# Patient Record
Sex: MALE | Race: WHITE | NOT HISPANIC OR LATINO | Employment: FULL TIME | URBAN - METROPOLITAN AREA
[De-identification: names, ages, dates, MRNs, and addresses within clinical notes are randomized per-mention and may not be internally consistent; named-entity substitution may affect disease eponyms.]

---

## 2022-05-25 ENCOUNTER — HOSPITAL ENCOUNTER (EMERGENCY)
Facility: HOSPITAL | Age: 37
Discharge: HOME | End: 2022-05-25
Attending: EMERGENCY MEDICINE | Admitting: EMERGENCY MEDICINE

## 2022-05-25 VITALS
DIASTOLIC BLOOD PRESSURE: 71 MMHG | RESPIRATION RATE: 18 BRPM | HEART RATE: 95 BPM | WEIGHT: 156.97 LBS | OXYGEN SATURATION: 100 % | HEIGHT: 74 IN | TEMPERATURE: 98.2 F | BODY MASS INDEX: 20.14 KG/M2 | SYSTOLIC BLOOD PRESSURE: 124 MMHG

## 2022-05-25 DIAGNOSIS — L03.111 CELLULITIS OF RIGHT AXILLA: Primary | ICD-10-CM

## 2022-05-25 PROCEDURE — 99282 EMERGENCY DEPT VISIT SF MDM: CPT

## 2022-05-25 RX ORDER — SULFAMETHOXAZOLE AND TRIMETHOPRIM 800; 160 MG/1; MG/1
1 TABLET ORAL 2 TIMES DAILY
Qty: 20 TABLET | Refills: 0 | Status: SHIPPED | OUTPATIENT
Start: 2022-05-25 | End: 2022-06-17

## 2022-05-25 RX ORDER — CEPHALEXIN 500 MG/1
500 CAPSULE ORAL 2 TIMES DAILY
Qty: 14 CAPSULE | Refills: 0 | Status: SHIPPED | OUTPATIENT
Start: 2022-05-25 | End: 2022-06-01

## 2022-05-25 NOTE — DISCHARGE INSTRUCTIONS
Complete full course of antibiotics as prescribed.  If symptoms not improved within 3 days, you develop worsening redness, or develop an abscess please return to the emergency department for further care your primary care provider.  Apply heat packs to the area 15 to 20 minutes at a time 4-5 times a day as needed for pain and swelling.

## 2022-05-25 NOTE — ED PROVIDER NOTES
Subjective   Luis Fang is a 36-year-old male who presents to the emergency department today complaining of rash in armpit.  Patient states the area of redness and swelling has been present for approximately 2 days.  Patient states the area is warm to the touch and is very painful.  Patient states he did have a rash that formed across the top of his back along his shoulders that has resolved approximately 1 week ago.  Patient denies fever, chest pain, shortness of breath, nausea, vomiting, diarrhea.  Patient does also mention that he has occasional weakness noted in his left arm since he had an injury to his left shoulder and is wondering if this may be related to nerve pains.  No other complaints or concerns at this time.      History provided by:  Patient   used: No    Rash  Location:  Shoulder/arm  Shoulder/arm rash location:  R axilla  Quality: painful, redness and swelling    Pain details:     Quality:  Aching    Severity:  Moderate    Onset quality:  Gradual    Duration: 2 days.    Timing:  Constant    Progression:  Worsening  Severity:  Mild  Onset quality:  Gradual  Duration: 2 days.  Timing:  Constant  Progression:  Worsening  Chronicity:  New  Relieved by:  None tried  Ineffective treatments:  None tried  Associated symptoms: no abdominal pain, no diarrhea, no fatigue, no fever, no headaches, no joint pain, no myalgias, no nausea, no shortness of breath, no sore throat, no throat swelling, no URI, not vomiting and not wheezing        Review of Systems   Constitutional: Negative for fatigue and fever.   HENT: Negative for sore throat.    Respiratory: Negative for shortness of breath and wheezing.    Gastrointestinal: Negative for abdominal pain, diarrhea, nausea and vomiting.   Musculoskeletal: Negative for arthralgias and myalgias.   Skin: Positive for rash.   Neurological: Negative for headaches.   All other systems reviewed and are negative.      History reviewed. No pertinent past  medical history.    No Known Allergies    History reviewed. No pertinent surgical history.    History reviewed. No pertinent family history.    Social History     Socioeconomic History   • Marital status: Single   Tobacco Use   • Smoking status: Current Every Day Smoker     Packs/day: 0.50     Years: 20.00     Pack years: 10.00   • Smokeless tobacco: Never Used   Vaping Use   • Vaping Use: Never used   Substance and Sexual Activity   • Alcohol use: Yes     Comment: occasionally   • Drug use: Yes     Types: Marijuana     Comment: admits to using earlier today.   • Sexual activity: Defer           Objective   Physical Exam  Vitals and nursing note reviewed.   Constitutional:       General: He is not in acute distress.     Appearance: Normal appearance. He is normal weight. He is not ill-appearing.   HENT:      Head: Normocephalic and atraumatic.      Nose: Nose normal.   Eyes:      Conjunctiva/sclera: Conjunctivae normal.      Pupils: Pupils are equal, round, and reactive to light.   Cardiovascular:      Rate and Rhythm: Normal rate and regular rhythm.      Heart sounds: Normal heart sounds.   Pulmonary:      Effort: Pulmonary effort is normal.      Breath sounds: Normal breath sounds.   Abdominal:      General: Abdomen is flat. Bowel sounds are normal. There is no distension.      Palpations: Abdomen is soft. There is no mass.      Tenderness: There is no abdominal tenderness. There is no guarding or rebound.      Hernia: No hernia is present.   Musculoskeletal:         General: Normal range of motion.      Cervical back: Normal range of motion and neck supple.   Skin:     General: Skin is warm and dry.          Neurological:      General: No focal deficit present.      Mental Status: He is alert and oriented to person, place, and time.   Psychiatric:         Mood and Affect: Mood normal.         Behavior: Behavior normal.         Thought Content: Thought content normal.         Judgment: Judgment normal.          Procedures           ED Course                                                 MDM  Number of Diagnoses or Management Options  Cellulitis of right axilla  Diagnosis management comments: I have spoken with patient. I have explained the patient´s condition, diagnoses and treatment plan based on the information available to me at this time. I have answered the patient's questions and addressed any concerns. The patient has a good  understanding of the patient´s diagnosis, condition, and treatment plan as can be expected at this point. The vital signs have been stable. The patient´s condition is stable and appropriate for discharge from the emergency department.      The patient will pursue further outpatient evaluation with the primary care physician or other designated or consulting physician as outlined in the discharge instructions. They are agreeable to this plan of care and follow-up instructions have been explained in detail. The patient has received these instructions in written format and have expressed an understanding of the discharge instructions. The patient is aware that any significant change in condition or worsening of symptoms should prompt an immediate return to this or the closest emergency department or call to 911.    Risk of Complications, Morbidity, and/or Mortality  Presenting problems: low  Diagnostic procedures: low  Management options: low    Patient Progress  Patient progress: stable      Final diagnoses:   Cellulitis of right axilla       ED Disposition  ED Disposition     ED Disposition   Discharge    Condition   Stable    Comment   --             Provider, No Known  [facility]  [place] [id number]               Medication List      New Prescriptions    cephalexin 500 MG capsule  Commonly known as: KEFLEX  Take 1 capsule by mouth 2 (Two) Times a Day for 7 days.     sulfamethoxazole-trimethoprim 800-160 MG per tablet  Commonly known as: BACTRIM DS,SEPTRA DS  Take 1  tablet by mouth 2 (Two) Times a Day.           Where to Get Your Medications      These medications were sent to [id number] - [place] - [address] AT [facility] - [phone]  - [phone]   [address] [place] [address]    Hours: 24-hours Phone: [phone]   · cephalexin 500 MG capsule  · sulfamethoxazole-trimethoprim 800-160 MG per tablet          Adam Tai PA-C  05/25/22 1921

## 2022-06-17 ENCOUNTER — OFFICE VISIT (OUTPATIENT)
Dept: FAMILY MEDICINE CLINIC | Facility: CLINIC | Age: 37
End: 2022-06-17

## 2022-06-17 VITALS
WEIGHT: 150 LBS | RESPIRATION RATE: 18 BRPM | BODY MASS INDEX: 19.25 KG/M2 | HEART RATE: 95 BPM | HEIGHT: 74 IN | SYSTOLIC BLOOD PRESSURE: 126 MMHG | TEMPERATURE: 97.5 F | OXYGEN SATURATION: 98 % | DIASTOLIC BLOOD PRESSURE: 88 MMHG

## 2022-06-17 DIAGNOSIS — Z23 NEED FOR TETANUS BOOSTER: ICD-10-CM

## 2022-06-17 DIAGNOSIS — A77.0 RMSF (ROCKY MOUNTAIN SPOTTED FEVER): ICD-10-CM

## 2022-06-17 DIAGNOSIS — S70.361A TICK BITE OF RIGHT THIGH, INITIAL ENCOUNTER: ICD-10-CM

## 2022-06-17 DIAGNOSIS — F41.9 ANXIETY: ICD-10-CM

## 2022-06-17 DIAGNOSIS — E53.8 LOW VITAMIN B12 LEVEL: ICD-10-CM

## 2022-06-17 DIAGNOSIS — J30.2 SEASONAL ALLERGIC RHINITIS, UNSPECIFIED TRIGGER: Primary | ICD-10-CM
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
## 2022-06-17 DIAGNOSIS — Z11.59 NEED FOR HEPATITIS C SCREENING TEST: ICD-10-CM

## 2022-06-17 DIAGNOSIS — R53.83 FATIGUE, UNSPECIFIED TYPE: ICD-10-CM

## 2022-06-17 DIAGNOSIS — F33.1 MODERATE EPISODE OF RECURRENT MAJOR DEPRESSIVE DISORDER: ICD-10-CM

## 2022-06-17 LAB
ALBUMIN SERPL-MCNC: 4.8 G/DL (ref 3.5–5.2)
ALBUMIN/GLOB SERPL: 2.1 G/DL
ALP SERPL-CCNC: 71 U/L (ref 39–117)
ALT SERPL W P-5'-P-CCNC: 19 U/L (ref 1–41)
ANION GAP SERPL CALCULATED.3IONS-SCNC: 9.8 MMOL/L (ref 5–15)
AST SERPL-CCNC: 15 U/L (ref 1–40)
BASOPHILS # BLD AUTO: 0.06 10*3/MM3 (ref 0–0.2)
BASOPHILS NFR BLD AUTO: 1 % (ref 0–1.5)
BILIRUB SERPL-MCNC: 0.4 MG/DL (ref 0–1.2)
BUN SERPL-MCNC: 12 MG/DL (ref 6–20)
BUN/CREAT SERPL: 12.4 (ref 7–25)
CALCIUM SPEC-SCNC: 9.4 MG/DL (ref 8.6–10.5)
CHLORIDE SERPL-SCNC: 103 MMOL/L (ref 98–107)
CO2 SERPL-SCNC: 27.2 MMOL/L (ref 22–29)
CREAT SERPL-MCNC: 0.97 MG/DL (ref 0.76–1.27)
DEPRECATED RDW RBC AUTO: 44.2 FL (ref 37–54)
EGFRCR SERPLBLD CKD-EPI 2021: 103.8 ML/MIN/1.73
EOSINOPHIL # BLD AUTO: 0.46 10*3/MM3 (ref 0–0.4)
EOSINOPHIL NFR BLD AUTO: 7.4 % (ref 0.3–6.2)
ERYTHROCYTE [DISTWIDTH] IN BLOOD BY AUTOMATED COUNT: 13 % (ref 12.3–15.4)
FOLATE SERPL-MCNC: 11.1 NG/ML (ref 4.78–24.2)
GLOBULIN UR ELPH-MCNC: 2.3 GM/DL
GLUCOSE SERPL-MCNC: 82 MG/DL (ref 65–99)
HCT VFR BLD AUTO: 43.7 % (ref 37.5–51)
HCV AB SER DONR QL: NORMAL
HGB BLD-MCNC: 14.4 G/DL (ref 13–17.7)
IMM GRANULOCYTES # BLD AUTO: 0.01 10*3/MM3 (ref 0–0.05)
IMM GRANULOCYTES NFR BLD AUTO: 0.2 % (ref 0–0.5)
LYMPHOCYTES # BLD AUTO: 1.59 10*3/MM3 (ref 0.7–3.1)
LYMPHOCYTES NFR BLD AUTO: 25.7 % (ref 19.6–45.3)
MCH RBC QN AUTO: 30.7 PG (ref 26.6–33)
MCHC RBC AUTO-ENTMCNC: 33 G/DL (ref 31.5–35.7)
MCV RBC AUTO: 93.2 FL (ref 79–97)
MONOCYTES # BLD AUTO: 0.59 10*3/MM3 (ref 0.1–0.9)
MONOCYTES NFR BLD AUTO: 9.5 % (ref 5–12)
NEUTROPHILS NFR BLD AUTO: 3.48 10*3/MM3 (ref 1.7–7)
NEUTROPHILS NFR BLD AUTO: 56.2 % (ref 42.7–76)
NRBC BLD AUTO-RTO: 0 /100 WBC (ref 0–0.2)
PLATELET # BLD AUTO: 460 10*3/MM3 (ref 140–450)
PMV BLD AUTO: 9.4 FL (ref 6–12)
POTASSIUM SERPL-SCNC: 4.6 MMOL/L (ref 3.5–5.2)
PROT SERPL-MCNC: 7.1 G/DL (ref 6–8.5)
RBC # BLD AUTO: 4.69 10*6/MM3 (ref 4.14–5.8)
SODIUM SERPL-SCNC: 140 MMOL/L (ref 136–145)
T4 FREE SERPL-MCNC: 1.43 NG/DL (ref 0.93–1.7)
TSH SERPL DL<=0.05 MIU/L-ACNC: 0.39 UIU/ML (ref 0.27–4.2)
VIT B12 BLD-MCNC: 480 PG/ML (ref 211–946)
WBC NRBC COR # BLD: 6.19 10*3/MM3 (ref 3.4–10.8)

## 2022-06-17 PROCEDURE — 80053 COMPREHEN METABOLIC PANEL: CPT | Performed by: NURSE PRACTITIONER

## 2022-06-17 PROCEDURE — 86617 LYME DISEASE ANTIBODY: CPT | Performed by: NURSE PRACTITIONER

## 2022-06-17 PROCEDURE — 84443 ASSAY THYROID STIM HORMONE: CPT | Performed by: NURSE PRACTITIONER

## 2022-06-17 PROCEDURE — 86757 RICKETTSIA ANTIBODY: CPT | Performed by: NURSE PRACTITIONER

## 2022-06-17 PROCEDURE — 85025 COMPLETE CBC W/AUTO DIFF WBC: CPT | Performed by: NURSE PRACTITIONER

## 2022-06-17 PROCEDURE — 90714 TD VACC NO PRESV 7 YRS+ IM: CPT | Performed by: NURSE PRACTITIONER

## 2022-06-17 PROCEDURE — 82607 VITAMIN B-12: CPT | Performed by: NURSE PRACTITIONER

## 2022-06-17 PROCEDURE — 86803 HEPATITIS C AB TEST: CPT | Performed by: NURSE PRACTITIONER

## 2022-06-17 PROCEDURE — 90471 IMMUNIZATION ADMIN: CPT | Performed by: NURSE PRACTITIONER

## 2022-06-17 PROCEDURE — 99204 OFFICE O/P NEW MOD 45 MIN: CPT | Performed by: NURSE PRACTITIONER

## 2022-06-17 PROCEDURE — 86666 EHRLICHIA ANTIBODY: CPT | Performed by: NURSE PRACTITIONER

## 2022-06-17 PROCEDURE — 84439 ASSAY OF FREE THYROXINE: CPT | Performed by: NURSE PRACTITIONER

## 2022-06-17 PROCEDURE — 82746 ASSAY OF FOLIC ACID SERUM: CPT | Performed by: NURSE PRACTITIONER

## 2022-06-17 RX ORDER — DOXYCYCLINE HYCLATE 100 MG/1
100 CAPSULE ORAL 2 TIMES DAILY
Qty: 20 CAPSULE | Refills: 0 | Status: SHIPPED | OUTPATIENT
Start: 2022-06-17 | End: 2022-06-27

## 2022-06-17 RX ORDER — FLUTICASONE PROPIONATE 50 MCG
2 SPRAY, SUSPENSION (ML) NASAL DAILY
Qty: 16 G | Refills: 5 | Status: SHIPPED | OUTPATIENT
Start: 2022-06-17

## 2022-06-17 RX ORDER — CETIRIZINE HYDROCHLORIDE 10 MG/1
10 TABLET ORAL DAILY
Qty: 30 TABLET | Refills: 5 | Status: SHIPPED | OUTPATIENT
Start: 2022-06-17

## 2022-06-17 NOTE — ASSESSMENT & PLAN NOTE
Anxiety is not controlled, will refer him to psychiatry for further evaluation and treatment due to history of adverse side effects to medications.

## 2022-06-17 NOTE — ASSESSMENT & PLAN NOTE
Patient's depression is recurrent and is moderate without psychosis. Their depression is currently active and the condition is newly identified. This will be reassessed at the next regular appointment. F/U as described:patient referred to Mental Health Specialist.  Due to his history of having side effects of antidepressants, will refer him to psychiatry for further evaluation and treatment.

## 2022-06-17 NOTE — PROGRESS NOTES
"Chief Complaint  Nasal Congestion (With clear and yellow mucus, started about 2 months ) and Eye Drainage (Red, itchy, and watery eyes)    Subjective          Luis Fang, 37 y.o. male presents to [facility]  History of Present Illness   He presents today as a new patient to establish care. He is complaining of allergies.  He states he has been having chronic allergies for the past 2 months.  He has tried over-the-counter [place] for cold.  He states he has tried Claritin without any good relief.  He states he did use Flonase recently and that did help.  He denies any ear pain, sore throat, sinus pain or pressure.    PHQ-9 score was positive.  He does have depression and anxiety.  No suicidal ideation/homicidal ideation.  He also states he has a history of bipolar.  He states he never liked taking the medication in the past but knows he probably needs it.  He states some antidepressants have made him feel worse.  He states he has a history of being addicted to pain medications and he does not like taking medications.    He states that he was bit by a tick about a week ago on his right thigh.  He states that it was a large red [place] surrounding the tick bite area but has improved.  He is complaining of fatigue as well.  He does not know when his last tetanus shot was.    Objective   Vital Signs:   /88   Pulse 95   Temp 97.5 °F (36.4 °C)   Resp 18   Ht 188 cm (74\")   Wt 68 kg (150 lb)   SpO2 98%   BMI 19.26 kg/m²     Current Outpatient Medications on File Prior to Visit   Medication Sig Dispense Refill   • [DISCONTINUED] sulfamethoxazole-trimethoprim (BACTRIM DS,SEPTRA DS) 800-160 MG per tablet Take 1 tablet by mouth 2 (Two) Times a Day. 20 tablet 0     No current facility-administered medications on file prior to visit.     Past Medical History:   Diagnosis Date   • Alcoholism (HCC)    • Anxiety    • Asthma    • Broken bones    • Chemical dependency (HCC)    • " Depression    • History of migraine headaches    • History of substance abuse ([facility])    • Shortness of breath    • Sinus congestion    • Substance abuse in family       Physical Exam  Vitals reviewed.   Constitutional:       Appearance: Normal appearance. He is well-developed.   HENT:      Right Ear: Tympanic membrane, ear canal and external ear normal.      Left Ear: Tympanic membrane, ear canal and external ear normal.      Mouth/Throat:      Mouth: Mucous membranes are moist.      Pharynx: No pharyngeal swelling, oropharyngeal exudate or posterior oropharyngeal erythema.   Neck:      Thyroid: No thyroid mass, thyromegaly or thyroid tenderness.   Cardiovascular:      Rate and Rhythm: Normal rate and regular rhythm.      Heart sounds: No murmur heard.    No friction rub. No gallop.   Pulmonary:      Effort: Pulmonary effort is normal.      Breath sounds: Normal breath sounds. No wheezing or rhonchi.   Lymphadenopathy:      Cervical: No cervical adenopathy.   Skin:     General: Skin is warm and dry.      Comments: Erythematous tick bite noted to right upper thigh, approximately 8 to 10 mm diameter.   Neurological:      Mental Status: He is alert and oriented to person, place, and time.      Cranial Nerves: No cranial nerve deficit.   Psychiatric:         Mood and Affect: Mood and affect normal.         Behavior: Behavior normal.         Thought Content: Thought content normal. Thought content does not include homicidal or suicidal ideation.         Judgment: Judgment normal.        Result Review :                 Assessment and Plan    Diagnoses and all orders for this visit:    1. Seasonal allergic rhinitis, unspecified trigger (Primary)  Assessment & Plan:  I will start him on Zyrtec and Flonase daily.  Advised to follow-up if not improving or any worsening of symptoms.    Orders:  -     cetirizine (zyrTEC) 10 MG tablet; Take 1 tablet by mouth Daily.  Dispense: 30 tablet; Refill: 5  -     fluticasone (Flonase) 50  MCG/ACT nasal spray; 2 sprays into the nostril(s) as directed by provider Daily.  Dispense: 16 g; Refill: 5  -     CBC Auto Differential  -     Comprehensive Metabolic Panel    2. Anxiety  Assessment & Plan:  Anxiety is not controlled, will refer him to psychiatry for further evaluation and treatment due to history of adverse side effects to medications.    Orders:  -     Ambulatory Referral to Psychiatry  -     TSH+Free T4  -     CBC Auto Differential  -     Comprehensive Metabolic Panel    3. Moderate episode of recurrent major depressive disorder (HCC)  Assessment & Plan:  Patient's depression is recurrent and is moderate without psychosis. Their depression is currently active and the condition is newly identified. This will be reassessed at the next regular appointment. F/U as described:patient referred to Mental Health Specialist.  Due to his history of having side effects of antidepressants, will refer him to psychiatry for further evaluation and treatment.    Orders:  -     Ambulatory Referral to Psychiatry  -     TSH+Free T4  -     CBC Auto Differential  -     Comprehensive Metabolic Panel    4. Tick bite of right thigh, initial encounter  Comments:  I will start him on doxycycline prophylactically and we will check labs today.  Orders:  -     Ehrlichia Antibody Panel  -     [facility] (IgG / M)  -     Lyme Disease, Line Blot  -     Cancel: Tdap Vaccine Greater Than or Equal To 6yo IM  -     tetanus-diphtheria toxoids (TENIVAC 5-2) injection 0.5 mL    5. Fatigue, unspecified type  Comments:  We will check labs today, will notify results.  Orders:  -     Vitamin B12  -     Folate    6. Need for hepatitis C screening test  -     Hepatitis C Antibody    7. Need for tetanus booster  -     tetanus-diphtheria toxoids (TENIVAC 5-2) injection 0.5 mL    Other orders  -     doxycycline (VIBRAMYCIN) 100 MG capsule; Take 1 capsule by mouth 2 (Two) Times a Day for 10 days.  Dispense: 20 capsule; Refill:  0      Follow Up   Return if symptoms worsen or fail to improve.  Patient was given instructions and counseling regarding his condition or for health maintenance advice. Please see specific information pulled into the AVS if appropriate.

## 2022-06-17 NOTE — ASSESSMENT & PLAN NOTE
I will start him on Zyrtec and Flonase daily.  Advised to follow-up if not improving or any worsening of symptoms.

## 2022-06-20 ENCOUNTER — OFFICE VISIT (OUTPATIENT)
Dept: FAMILY MEDICINE CLINIC | Facility: CLINIC | Age: 37
End: 2022-06-20

## 2022-06-20 VITALS
SYSTOLIC BLOOD PRESSURE: 126 MMHG | BODY MASS INDEX: 20.64 KG/M2 | DIASTOLIC BLOOD PRESSURE: 82 MMHG | RESPIRATION RATE: 18 BRPM | OXYGEN SATURATION: 98 % | HEIGHT: 74 IN | TEMPERATURE: 97.9 F | WEIGHT: 160.8 LBS | HEART RATE: 85 BPM

## 2022-06-20 DIAGNOSIS — S70.361D TICK BITE OF RIGHT THIGH, SUBSEQUENT ENCOUNTER: Primary | ICD-10-CM
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
## 2022-06-20 DIAGNOSIS — R94.8 ABNORMAL METABOLISM: ICD-10-CM
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
## 2022-06-20 PROBLEM — S70.361A TICK BITE OF RIGHT THIGH: Status: ACTIVE | Noted: 2022-06-20

## 2022-06-20 PROCEDURE — 99213 OFFICE O/P EST LOW 20 MIN: CPT | Performed by: NURSE PRACTITIONER

## 2022-06-20 NOTE — ASSESSMENT & PLAN NOTE
We discussed that there is not any medication to slow down his metabolism.  I offered that I could give him medications to increase his appetite but he declines having a decreased appetite.  He will continue with protein powders and eating high calorie diet.

## 2022-06-20 NOTE — ASSESSMENT & PLAN NOTE
Improving on doxycycline.  Advised to continue/finish all doxycycline.  I will notify him when I get the results of the tickborne panels.

## 2022-06-20 NOTE — PROGRESS NOTES
"Chief Complaint  difficulty gaining weight (Would like to discuss options to lower metabolism)    Subjective          Luis Fang, 37 y.o. male presents to [facility]  History of Present Illness   He presents today to discuss trying to gain weight.  He recently had some lab work-up done but we are still waiting for the tickborne illness labs.  He is on doxycycline due to a recent tick bite.  He states that the infected area is looking better and he is starting to feel better.  We reviewed his lab work-up: CBC, CMP, thyroid profile, vitamin B12/folate were all within normal limits.  Hepatitis C screening was negative.  Tickborne illness panels are still pending.    He states he is always had trouble with having a high metabolism and unable to gain weight.  He has done supplements such as Ensure in the past but states they get expensive.  He uses protein powders.  He states that he eats all the time.  He states if he does not keep up eating and supplements that he will lose weight.  He denies any decreased appetite.  His thyroid levels were normal.    Objective   Vital Signs:   /82   Pulse 85   Temp 97.9 °F (36.6 °C)   Resp 18   Ht 188 cm (74\")   Wt 72.9 kg (160 lb 12.8 oz)   SpO2 98%   BMI 20.65 kg/m²     Current Outpatient Medications on File Prior to Visit   Medication Sig Dispense Refill   • cetirizine (zyrTEC) 10 MG tablet Take 1 tablet by mouth Daily. 30 tablet 5   • doxycycline (VIBRAMYCIN) 100 MG capsule Take 1 capsule by mouth 2 (Two) Times a Day for 10 days. 20 capsule 0   • fluticasone (Flonase) 50 MCG/ACT nasal spray 2 sprays into the nostril(s) as directed by provider Daily. 16 g 5     No current facility-administered medications on file prior to visit.     Past Medical History:   Diagnosis Date   • Alcoholism (HCC)    • Anxiety    • Asthma    • Broken bones    • Chemical dependency (HCC)    • Depression    • History of migraine headaches    • History of " substance abuse (HCC)    • Shortness of breath    • Sinus congestion    • Substance abuse in family       Physical Exam  Vitals reviewed.   Constitutional:       Appearance: Normal appearance. He is underweight.   Neck:      Thyroid: No thyroid mass, thyromegaly or thyroid tenderness.   Cardiovascular:      Rate and Rhythm: Normal rate and regular rhythm.      Heart sounds: No murmur heard.    No friction rub. No gallop.   Pulmonary:      Effort: Pulmonary effort is normal.      Breath sounds: Normal breath sounds. No wheezing or rhonchi.   Lymphadenopathy:      Cervical: No cervical adenopathy.   Skin:     General: Skin is warm and dry.   Neurological:      Mental Status: He is alert and oriented to person, place, and time.      Cranial Nerves: No cranial nerve deficit.   Psychiatric:         Mood and Affect: Mood and affect normal.         Behavior: Behavior normal.         Thought Content: Thought content normal. Thought content does not include homicidal or suicidal ideation.         Judgment: Judgment normal.        Result Review :     CMP    CMP 6/17/22   Glucose 82   BUN 12   Creatinine 0.97   Sodium 140   Potassium 4.6   Chloride 103   Calcium 9.4   Albumin 4.80   Total Bilirubin 0.4   Alkaline Phosphatase 71   AST (SGOT) 15   ALT (SGPT) 19           CBC    CBC 6/17/22   WBC 6.19   RBC 4.69   Hemoglobin 14.4   Hematocrit 43.7   MCV 93.2   MCH 30.7   MCHC 33.0   RDW 13.0   Platelets 460 (A)   (A) Abnormal value            TSH    TSH 6/17/22   TSH 0.393                     Assessment and Plan    Diagnoses and all orders for this visit:    1. Tick bite of right thigh, subsequent encounter (Primary)  Assessment & Plan:  Improving on doxycycline.  Advised to continue/finish all doxycycline.  I will notify him when I get the results of the tickborne panels.      2. Abnormal metabolism  Assessment & Plan:  We discussed that there is not any medication to slow down his metabolism.  I offered that I could give him  medications to increase his appetite but he declines having a decreased appetite.  He will continue with protein powders and eating high calorie diet.        Follow Up   Return if symptoms worsen or fail to improve.  Patient was given instructions and counseling regarding his condition or for health maintenance advice. Please see specific information pulled into the AVS if appropriate.

## 2022-06-24 LAB
A PHAGOCYTOPH IGG TITR SER IF: NEGATIVE {TITER}
A PHAGOCYTOPH IGM TITR SER IF: NEGATIVE {TITER}
E CHAFFEENSIS IGG TITR SER IF: NEGATIVE {TITER}
E CHAFFEENSIS IGM TITR SER IF: NEGATIVE {TITER}

## 2022-06-28 LAB
R RICKETTSI IGG SER QL IA: POSITIVE
R RICKETTSI IGG TITR SER IF: NORMAL {TITER}
R RICKETTSI IGM SER-ACNC: 1.05 INDEX (ref 0–0.89)

## 2022-06-29 RX ORDER — LANOLIN ALCOHOL/MO/W.PET/CERES
1000 CREAM (GRAM) TOPICAL DAILY
Qty: 90 TABLET | Refills: 3 | Status: SHIPPED | OUTPATIENT
Start: 2022-06-29 | End: 2022-08-09

## 2022-07-07 LAB
B BURGDOR IGG PATRN SER IB-IMP: NEGATIVE
B BURGDOR IGM PATRN SER IB-IMP: NEGATIVE
B BURGDOR18KD IGG SER QL IB: ABNORMAL
B BURGDOR23KD IGG SER QL IB: ABNORMAL
B BURGDOR23KD IGM SER QL IB: ABNORMAL
B BURGDOR28KD IGG SER QL IB: ABNORMAL
B BURGDOR30KD IGG SER QL IB: ABNORMAL
B BURGDOR39KD IGG SER QL IB: ABNORMAL
B BURGDOR39KD IGM SER QL IB: ABNORMAL
B BURGDOR41KD IGG SER QL IB: PRESENT
B BURGDOR41KD IGM SER QL IB: ABNORMAL
B BURGDOR45KD IGG SER QL IB: ABNORMAL
B BURGDOR58KD IGG SER QL IB: ABNORMAL
B BURGDOR66KD IGG SER QL IB: ABNORMAL
B BURGDOR93KD IGG SER QL IB: ABNORMAL
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
## 2022-08-08 NOTE — PATIENT INSTRUCTIONS
1.  Please return to clinic at your next scheduled visit.  Contact the clinic ([phone]) at least 24 hours prior in the event you need to cancel.  2.  Do no harm to yourself or others.    3.  Avoid alcohol and drugs.    4.  Take all medications as prescribed.  Please contact the clinic with any concerns. If you are in need of medication refills, please call the clinic at [phone].    5. Should you want to get in touch with your provider, Dr. Edin Dean, please utilize [employer] or contact the office ([phone]), and staff will be able to page Dr. Dean directly.  6.  In the event you have personal crisis, contact the following crisis numbers: Suicide Prevention Hotline 1-992.482.5574; [facility] Helpline 8-223-328-BVWQ; [facility] Emergency Room [phone]; text HELLO to [phone]; or 582.     SPECIFIC RECOMMENDATIONS:     1.      Medications discussed at this encounter:                   - start bupropion     2.      Psychotherapy recommendations:      3.     Return to clinic: 6 weeks

## 2022-08-09 ENCOUNTER — OFFICE VISIT (OUTPATIENT)
Dept: PSYCHIATRY | Facility: CLINIC | Age: 37
End: 2022-08-09

## 2022-08-09 VITALS
HEIGHT: 74 IN | DIASTOLIC BLOOD PRESSURE: 72 MMHG | WEIGHT: 156 LBS | SYSTOLIC BLOOD PRESSURE: 145 MMHG | BODY MASS INDEX: 20.02 KG/M2

## 2022-08-09 DIAGNOSIS — F41.1 GENERALIZED ANXIETY DISORDER: ICD-10-CM

## 2022-08-09 DIAGNOSIS — F51.05 INSOMNIA DUE TO MENTAL CONDITION: ICD-10-CM

## 2022-08-09 DIAGNOSIS — F33.1 MAJOR DEPRESSIVE DISORDER, RECURRENT EPISODE, MODERATE: Primary | ICD-10-CM

## 2022-08-09 DIAGNOSIS — F90.0 ADHD (ATTENTION DEFICIT HYPERACTIVITY DISORDER), INATTENTIVE TYPE: ICD-10-CM

## 2022-08-09 PROCEDURE — 90792 PSYCH DIAG EVAL W/MED SRVCS: CPT | Performed by: STUDENT IN AN ORGANIZED HEALTH CARE EDUCATION/TRAINING PROGRAM

## 2022-08-09 RX ORDER — MULTIPLE VITAMINS W/ MINERALS TAB 9MG-400MCG
1 TAB ORAL DAILY
COMMUNITY

## 2022-08-09 RX ORDER — BUPROPION HYDROCHLORIDE 150 MG/1
150 TABLET ORAL EVERY MORNING
Qty: 30 TABLET | Refills: 2 | Status: SHIPPED | OUTPATIENT
Start: 2022-08-09 | End: 2022-10-16

## 2022-08-09 NOTE — TREATMENT PLAN
Multi-Disciplinary Problems (from Behavioral Health Treatment Plan)    Active Problems     Problem: Anxiety  Start Date: 08/09/22    Problem Details: The patient self-scales this problem as a 6 with 10 being the worst.        Goal Priority Start Date Expected End Date End Date    Patient will develop and implement behavioral and cognitive strategies to reduce anxiety and irrational fears. -- 08/09/22 -- --    Goal Details: Progress toward goal:  Not appropriate to rate progress toward goal since this is the initial treatment plan.        Goal Intervention Frequency Start Date End Date    Help patient explore past emotional issues in relation to present anxiety. Q Month 08/09/22 --    Intervention Details: Duration of treatment until until remission of symptoms.        Goal Intervention Frequency Start Date End Date    Help patient develop an awareness of their cognitive and physical responses to anxiety. Q Month 08/09/22 --    Intervention Details: Duration of treatment until until remission of symptoms.              Problem: Depression  Start Date: 08/09/22    Problem Details: The patient self-scales this problem as a 9 with 10 being the worst.        Goal Priority Start Date Expected End Date End Date    Patient will demonstrate the ability to initiate new constructive life skills outside of sessions on a consistent basis. -- 08/09/22 -- --    Goal Details: Progress toward goal:  Not appropriate to rate progress toward goal since this is the initial treatment plan.        Goal Intervention Frequency Start Date End Date    Assist patient in setting attainable activities of daily living goals. PRN 08/09/22 --    Goal Intervention Frequency Start Date End Date    Provide education about depression Q Month 08/09/22 --    Intervention Details: Duration of treatment until until remission of symptoms.        Goal Intervention Frequency Start Date End Date    Assist patient in developing healthy coping strategies. Q Month  08/09/22 --    Intervention Details: Duration of treatment until until remission of symptoms.              Problem: ADHD (Adult)  Start Date: 08/09/22    Problem Details: The patient self-scales this problem as a 10 with 10 being the worst.      Goal Priority Start Date Expected End Date End Date    Patient will sustain attention and concentration to complete chores, and work responsibilites and increase positive interaction in all relationships. -- 08/09/22 -- --    Goal Details: Progress toward goal:  Not appropriate to rate progress toward goal since this is the initial treatment plan.      Goal Intervention Frequency Start Date End Date    Assist patient in setting responsible goals and breaking down large tasks. Q Month 08/09/22 --    Intervention Details: Duration of treatment until until remission of symptoms.      Goal Intervention Frequency Start Date End Date    Assist patient in using self monitoring checklist to improve attention, work performance, and social skills. Q Month 08/09/22 --    Intervention Details: Duration of treatment until until remission of symptoms.                  Reviewed By     Edin Dean MD 08/09/22 2748                 I have discussed and reviewed this treatment plan with the patient.

## 2022-08-23 ENCOUNTER — PATIENT ROUNDING (BHMG ONLY) (OUTPATIENT)
Dept: PSYCHIATRY | Facility: CLINIC | Age: 37
End: 2022-08-23

## 2022-08-23 NOTE — PROGRESS NOTES
August 23, 2022    Hello, may I speak with Luis Fang?    My name is Kyara      I am  with [facility]  [facility]  [address] [facility]  [place] [address] [phone].    Before we get started may I verify your date of birth? 1985    I am calling to officially welcome you to our practice and ask about your recent visit. Is this a good time to talk? No, left voicemail to why I was calling    Tell me about your visit with us. What things went well?  N/A       We're always looking for ways to make our patients' experiences even better. Do you have recommendations on ways we may improve?  N/A    Overall were you satisfied with your first visit to our practice? N/A       I appreciate you taking the time to speak with me today. Is there anything else I can do for you? N/A      Thank you, and have a great day.

## 2022-09-27 ENCOUNTER — HOSPITAL ENCOUNTER (EMERGENCY)
Facility: HOSPITAL | Age: 37
Discharge: HOME | End: 2022-09-27
Attending: EMERGENCY MEDICINE | Admitting: EMERGENCY MEDICINE

## 2022-09-27 ENCOUNTER — APPOINTMENT (OUTPATIENT)
Dept: GENERAL RADIOLOGY | Facility: HOSPITAL | Age: 37
End: 2022-09-27

## 2022-09-27 VITALS
BODY MASS INDEX: 18.16 KG/M2 | OXYGEN SATURATION: 100 % | DIASTOLIC BLOOD PRESSURE: 87 MMHG | WEIGHT: 141.54 LBS | SYSTOLIC BLOOD PRESSURE: 139 MMHG | HEART RATE: 90 BPM | RESPIRATION RATE: 18 BRPM | HEIGHT: 74 IN | TEMPERATURE: 98.1 F

## 2022-09-27 DIAGNOSIS — G89.29 CHRONIC PAIN OF LEFT KNEE: Primary | ICD-10-CM

## 2022-09-27 DIAGNOSIS — M25.562 CHRONIC PAIN OF LEFT KNEE: Primary | ICD-10-CM

## 2022-09-27 PROCEDURE — 73562 X-RAY EXAM OF KNEE 3: CPT

## 2022-09-27 PROCEDURE — 99283 EMERGENCY DEPT VISIT LOW MDM: CPT

## 2022-10-02 ENCOUNTER — APPOINTMENT (OUTPATIENT)
Dept: CT IMAGING | Facility: HOSPITAL | Age: 37
End: 2022-10-02

## 2022-10-02 ENCOUNTER — HOSPITAL ENCOUNTER (EMERGENCY)
Facility: HOSPITAL | Age: 37
Discharge: HOME | End: 2022-10-02
Attending: EMERGENCY MEDICINE | Admitting: EMERGENCY MEDICINE

## 2022-10-02 VITALS
TEMPERATURE: 97.8 F | WEIGHT: 142.42 LBS | HEIGHT: 74 IN | OXYGEN SATURATION: 100 % | RESPIRATION RATE: 18 BRPM | BODY MASS INDEX: 18.28 KG/M2 | DIASTOLIC BLOOD PRESSURE: 71 MMHG | HEART RATE: 74 BPM | SYSTOLIC BLOOD PRESSURE: 111 MMHG

## 2022-10-02 DIAGNOSIS — G89.29 CHRONIC PAIN OF LEFT KNEE: ICD-10-CM

## 2022-10-02 DIAGNOSIS — M25.562 CHRONIC PAIN OF LEFT KNEE: ICD-10-CM

## 2022-10-02 DIAGNOSIS — K59.00 CONSTIPATION, UNSPECIFIED CONSTIPATION TYPE: ICD-10-CM

## 2022-10-02 DIAGNOSIS — S83.005A DISLOCATION OF LEFT PATELLA, INITIAL ENCOUNTER: ICD-10-CM

## 2022-10-02 DIAGNOSIS — R10.32 LEFT LOWER QUADRANT ABDOMINAL PAIN: Primary | ICD-10-CM

## 2022-10-02 LAB
BASOPHILS # BLD AUTO: 0.07 10*3/MM3 (ref 0–0.2)
BASOPHILS NFR BLD AUTO: 0.9 % (ref 0–1.5)
BILIRUB UR QL STRIP: NEGATIVE
C TRACH RRNA CVX QL NAA+PROBE: NOT DETECTED
CLARITY UR: CLEAR
COLOR UR: YELLOW
DEPRECATED RDW RBC AUTO: 43.2 FL (ref 37–54)
EOSINOPHIL # BLD AUTO: 0.9 10*3/MM3 (ref 0–0.4)
EOSINOPHIL NFR BLD AUTO: 11.1 % (ref 0.3–6.2)
ERYTHROCYTE [DISTWIDTH] IN BLOOD BY AUTOMATED COUNT: 12.9 % (ref 12.3–15.4)
GLUCOSE UR STRIP-MCNC: NEGATIVE MG/DL
HCT VFR BLD AUTO: 46.6 % (ref 37.5–51)
HGB BLD-MCNC: 15.7 G/DL (ref 13–17.7)
HGB UR QL STRIP.AUTO: NEGATIVE
IMM GRANULOCYTES # BLD AUTO: 0.01 10*3/MM3 (ref 0–0.05)
IMM GRANULOCYTES NFR BLD AUTO: 0.1 % (ref 0–0.5)
KETONES UR QL STRIP: NEGATIVE
LEUKOCYTE ESTERASE UR QL STRIP.AUTO: NEGATIVE
LYMPHOCYTES # BLD AUTO: 3.01 10*3/MM3 (ref 0.7–3.1)
LYMPHOCYTES NFR BLD AUTO: 37 % (ref 19.6–45.3)
MCH RBC QN AUTO: 31 PG (ref 26.6–33)
MCHC RBC AUTO-ENTMCNC: 33.7 G/DL (ref 31.5–35.7)
MCV RBC AUTO: 91.9 FL (ref 79–97)
MONOCYTES # BLD AUTO: 0.74 10*3/MM3 (ref 0.1–0.9)
MONOCYTES NFR BLD AUTO: 9.1 % (ref 5–12)
N GONORRHOEA RRNA SPEC QL NAA+PROBE: NOT DETECTED
NEUTROPHILS NFR BLD AUTO: 3.41 10*3/MM3 (ref 1.7–7)
NEUTROPHILS NFR BLD AUTO: 41.8 % (ref 42.7–76)
NITRITE UR QL STRIP: NEGATIVE
NRBC BLD AUTO-RTO: 0 /100 WBC (ref 0–0.2)
PH UR STRIP.AUTO: 5.5 [PH] (ref 5–8)
PLATELET # BLD AUTO: 439 10*3/MM3 (ref 140–450)
PMV BLD AUTO: 9.3 FL (ref 6–12)
PROT UR QL STRIP: NEGATIVE
RBC # BLD AUTO: 5.07 10*6/MM3 (ref 4.14–5.8)
SP GR UR STRIP: 1.01 (ref 1–1.03)
UROBILINOGEN UR QL STRIP: NORMAL
WBC NRBC COR # BLD: 8.14 10*3/MM3 (ref 3.4–10.8)

## 2022-10-02 PROCEDURE — 87491 CHLMYD TRACH DNA AMP PROBE: CPT | Performed by: EMERGENCY MEDICINE

## 2022-10-02 PROCEDURE — 36415 COLL VENOUS BLD VENIPUNCTURE: CPT

## 2022-10-02 PROCEDURE — 74177 CT ABD & PELVIS W/CONTRAST: CPT

## 2022-10-02 PROCEDURE — 99284 EMERGENCY DEPT VISIT MOD MDM: CPT

## 2022-10-02 PROCEDURE — 85025 COMPLETE CBC W/AUTO DIFF WBC: CPT | Performed by: EMERGENCY MEDICINE

## 2022-10-02 PROCEDURE — 0 IOPAMIDOL PER 1 ML: Performed by: EMERGENCY MEDICINE

## 2022-10-02 PROCEDURE — 87591 N.GONORRHOEAE DNA AMP PROB: CPT | Performed by: EMERGENCY MEDICINE

## 2022-10-02 PROCEDURE — 81003 URINALYSIS AUTO W/O SCOPE: CPT

## 2022-10-02 RX ORDER — SODIUM CHLORIDE 0.9 % (FLUSH) 0.9 %
10 SYRINGE (ML) INJECTION AS NEEDED
Status: DISCONTINUED | OUTPATIENT
Start: 2022-10-02 | End: 2022-10-02 | Stop reason: HOSPADM

## 2022-10-02 RX ORDER — DOCUSATE SODIUM 100 MG/1
100 CAPSULE, LIQUID FILLED ORAL 2 TIMES DAILY
Qty: 6 CAPSULE | Refills: 0 | Status: SHIPPED | OUTPATIENT
Start: 2022-10-02 | End: 2022-10-05

## 2022-10-02 RX ADMIN — SODIUM CHLORIDE 1000 ML: 9 INJECTION, SOLUTION INTRAVENOUS at 15:33

## 2022-10-02 RX ADMIN — IOPAMIDOL 100 ML: 755 INJECTION, SOLUTION INTRAVENOUS at 16:00
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
## 2022-10-02 NOTE — DISCHARGE INSTRUCTIONS
Please drink plenty of fluids.    Please follow-up with your doctor for reexamination of the abdomen, after you have used the mag citrate and Dulcolax.     Return to the emergency room immediately for intractable pain, intractable vomiting, fever, shaking chills, muscle aches, near passing out, passing out or any new symptoms you are concerned with    Please use over-the-counter Tylenol and Motrin for the left knee pain    No weightbearing on the left knee until released by orthopedic surgeon.  Please wear your knee immobilizer while up and awake

## 2022-10-16 ENCOUNTER — HOSPITAL ENCOUNTER (EMERGENCY)
Facility: HOSPITAL | Age: 37
Discharge: HOME | End: 2022-10-16
Attending: EMERGENCY MEDICINE | Admitting: EMERGENCY MEDICINE

## 2022-10-16 VITALS
BODY MASS INDEX: 18.5 KG/M2 | RESPIRATION RATE: 20 BRPM | OXYGEN SATURATION: 97 % | HEIGHT: 74 IN | SYSTOLIC BLOOD PRESSURE: 159 MMHG | TEMPERATURE: 98 F | HEART RATE: 86 BPM | DIASTOLIC BLOOD PRESSURE: 89 MMHG | WEIGHT: 144.18 LBS

## 2022-10-16 DIAGNOSIS — F15.10 METHAMPHETAMINE ABUSE: Primary | ICD-10-CM

## 2022-10-16 LAB
ALBUMIN SERPL-MCNC: 4.6 G/DL (ref 3.5–5.2)
ALBUMIN/GLOB SERPL: 1.5 G/DL
ALP SERPL-CCNC: 57 U/L (ref 39–117)
ALT SERPL W P-5'-P-CCNC: 16 U/L (ref 1–41)
AMPHET+METHAMPHET UR QL: POSITIVE
ANION GAP SERPL CALCULATED.3IONS-SCNC: 7.5 MMOL/L (ref 5–15)
APAP SERPL-MCNC: <5 MCG/ML (ref 0–30)
AST SERPL-CCNC: 14 U/L (ref 1–40)
BARBITURATES UR QL SCN: NEGATIVE
BASOPHILS # BLD AUTO: 0.04 10*3/MM3 (ref 0–0.2)
BASOPHILS NFR BLD AUTO: 0.4 % (ref 0–1.5)
BENZODIAZ UR QL SCN: NEGATIVE
BILIRUB SERPL-MCNC: 0.2 MG/DL (ref 0–1.2)
BUN SERPL-MCNC: 13 MG/DL (ref 6–20)
BUN/CREAT SERPL: 14.3 (ref 7–25)
CALCIUM SPEC-SCNC: 9.2 MG/DL (ref 8.6–10.5)
CANNABINOIDS SERPL QL: POSITIVE
CHLORIDE SERPL-SCNC: 97 MMOL/L (ref 98–107)
CO2 SERPL-SCNC: 26.5 MMOL/L (ref 22–29)
COCAINE UR QL: NEGATIVE
CREAT SERPL-MCNC: 0.91 MG/DL (ref 0.76–1.27)
DEPRECATED RDW RBC AUTO: 42.2 FL (ref 37–54)
EGFRCR SERPLBLD CKD-EPI 2021: 112 ML/MIN/1.73
EOSINOPHIL # BLD AUTO: 0.35 10*3/MM3 (ref 0–0.4)
EOSINOPHIL NFR BLD AUTO: 3.2 % (ref 0.3–6.2)
ERYTHROCYTE [DISTWIDTH] IN BLOOD BY AUTOMATED COUNT: 12.8 % (ref 12.3–15.4)
ETHANOL BLD-MCNC: <10 MG/DL (ref 0–10)
ETHANOL UR QL: <0.01 %
GLOBULIN UR ELPH-MCNC: 3 GM/DL
GLUCOSE BLDC GLUCOMTR-MCNC: 119 MG/DL (ref 70–99)
GLUCOSE SERPL-MCNC: 134 MG/DL (ref 65–99)
HCT VFR BLD AUTO: 43 % (ref 37.5–51)
HGB BLD-MCNC: 14.6 G/DL (ref 13–17.7)
HOLD SPECIMEN: NORMAL
HOLD SPECIMEN: NORMAL
IMM GRANULOCYTES # BLD AUTO: 0.02 10*3/MM3 (ref 0–0.05)
IMM GRANULOCYTES NFR BLD AUTO: 0.2 % (ref 0–0.5)
LYMPHOCYTES # BLD AUTO: 1.97 10*3/MM3 (ref 0.7–3.1)
LYMPHOCYTES NFR BLD AUTO: 17.8 % (ref 19.6–45.3)
MCH RBC QN AUTO: 30.7 PG (ref 26.6–33)
MCHC RBC AUTO-ENTMCNC: 34 G/DL (ref 31.5–35.7)
MCV RBC AUTO: 90.3 FL (ref 79–97)
METHADONE UR QL SCN: NEGATIVE
MONOCYTES # BLD AUTO: 0.78 10*3/MM3 (ref 0.1–0.9)
MONOCYTES NFR BLD AUTO: 7 % (ref 5–12)
NEUTROPHILS NFR BLD AUTO: 7.92 10*3/MM3 (ref 1.7–7)
NEUTROPHILS NFR BLD AUTO: 71.4 % (ref 42.7–76)
NRBC BLD AUTO-RTO: 0 /100 WBC (ref 0–0.2)
OPIATES UR QL: NEGATIVE
OXYCODONE UR QL SCN: NEGATIVE
PLATELET # BLD AUTO: 444 10*3/MM3 (ref 140–450)
PMV BLD AUTO: 8.3 FL (ref 6–12)
POTASSIUM SERPL-SCNC: 4.1 MMOL/L (ref 3.5–5.2)
PROT SERPL-MCNC: 7.6 G/DL (ref 6–8.5)
RBC # BLD AUTO: 4.76 10*6/MM3 (ref 4.14–5.8)
SALICYLATES SERPL-MCNC: 1.9 MG/DL
SODIUM SERPL-SCNC: 131 MMOL/L (ref 136–145)
WBC NRBC COR # BLD: 11.08 10*3/MM3 (ref 3.4–10.8)
WHOLE BLOOD HOLD COAG: NORMAL
WHOLE BLOOD HOLD SPECIMEN: NORMAL

## 2022-10-16 PROCEDURE — 80307 DRUG TEST PRSMV CHEM ANLYZR: CPT | Performed by: EMERGENCY MEDICINE

## 2022-10-16 PROCEDURE — 85025 COMPLETE CBC W/AUTO DIFF WBC: CPT

## 2022-10-16 PROCEDURE — 99283 EMERGENCY DEPT VISIT LOW MDM: CPT

## 2022-10-16 PROCEDURE — 80053 COMPREHEN METABOLIC PANEL: CPT | Performed by: EMERGENCY MEDICINE

## 2022-10-16 PROCEDURE — 36415 COLL VENOUS BLD VENIPUNCTURE: CPT

## 2022-10-16 PROCEDURE — 80179 DRUG ASSAY SALICYLATE: CPT | Performed by: EMERGENCY MEDICINE

## 2022-10-16 PROCEDURE — 82962 GLUCOSE BLOOD TEST: CPT

## 2022-10-16 PROCEDURE — 80143 DRUG ASSAY ACETAMINOPHEN: CPT | Performed by: EMERGENCY MEDICINE

## 2022-10-16 PROCEDURE — 82077 ASSAY SPEC XCP UR&BREATH IA: CPT | Performed by: EMERGENCY MEDICINE

## 2022-10-16 RX ORDER — SODIUM CHLORIDE 0.9 % (FLUSH) 0.9 %
10 SYRINGE (ML) INJECTION AS NEEDED
Status: DISCONTINUED | OUTPATIENT
Start: 2022-10-16 | End: 2022-10-16 | Stop reason: HOSPADM

## 2022-10-16 NOTE — ED NOTES
"Pt reports smoking 1/8 of marijuana this morning, and that smoking makes his paranoia worse. He also reports that he's \"really high\" right now and that he wants to get sober.  "

## 2022-10-16 NOTE — ED PROVIDER NOTES
"Time: 12:00 PM EDT  Arrived by: private car  Chief Complaint: addiction problem  History provided by: patient  History is limited by: N/A    History of Present Illness:  Patient is a 37 y.o. year old male who presents to the emergency department with addiction problem.    Patient arrives to ED via private car. Patient has a medical history of asthma, migraines, bipolar disorder, depression, anxiety with panic disorder, ADHD, autism spectrum disorder, OCD, alcoholism, and substance abuse disorder. Patient is a current, daily smoker and uses vapes daily, no current alcohol use, but is currently using drugs (methamphetamine and marijuana).     Patient states he is really worried about mental health since he started using methamphetamine again and started experiencing symptoms of paranoia. Patient also confirms symptoms of auditory hallucinations, but denies suicidal ideation, self-harm, homicidal ideation. Patient reports when he is \"doing dope\" he feels paranoid and feels like \"everyone wants to kill him\" when he is actively using. Patient reports he wants to get sober and go to rehabilitation again, with his last use of methamphetamine on Friday (10/14/2022). Patient reports he is still smoking marijuana, he smokers 1/8th this morning (10/16/2022), but it only worsens paranoia symptoms. Patient reports being \"really high\" to triage staff. Patient reports he has gone to rehabilitation in the past, over a year ago. Patient admits alcoholism history, but denies any alcohol use at this time. Patient is concerned about losing his kids and the long-term effects off methamphetamine use on his brain, which are his motivations to quit.      History provided by:  Patient      Patient Care Team  Primary Care Provider: Francisca Su APRN    Past Medical History:     Allergies   Allergen Reactions   • Hornet Venom Anaphylaxis     Past Medical History:   Diagnosis Date   • ADHD (attention deficit hyperactivity disorder)  "   • Alcoholism (HCC)    • Anxiety    • Asthma    • Autism spectrum disorder    • Bipolar disorder ([facility])    • Broken bones    • Chemical dependency ([facility])    • Depression    • Head injury    • History of migraine headaches    • History of substance abuse ([facility])    • Obsessive-compulsive disorder    • Panic disorder    • Shortness of breath    • Sinus congestion    • Substance abuse (HCC)    • Substance abuse in family    • Withdrawal symptoms, alcohol ([facility])    • Withdrawal symptoms, drug or narcotic ([facility])      Past Surgical History:   Procedure Laterality Date   • SHOULDER SURGERY Left      Family History   Problem Relation Age of Onset   • Depression Mother    • Anxiety disorder Mother    • ADD / ADHD Mother    • Diabetes Mother    • Drug abuse Father    • Self-Injurious Behavior  Sister    • Seizures Sister    • ADD / ADHD Sister    • Hypertension Maternal Grandmother    • Diabetes Maternal Grandmother    • Dementia Paternal Grandmother    • ADD / ADHD Other        Home Medications:  Prior to Admission medications    Medication Sig Start Date End Date Taking? Authorizing Provider   cetirizine (zyrTEC) 10 MG tablet Take 1 tablet by mouth Daily. 6/17/22   Francisca Su APRN   fluticasone (Flonase) 50 MCG/ACT nasal spray 2 sprays into the nostril(s) as directed by provider Daily. 6/17/22   Francisca Su APRN   multivitamin with minerals tablet tablet Take 1 tablet by mouth Daily.    Provider, MD Alirio   buPROPion XL (Wellbutrin XL) 150 MG 24 hr tablet Take 1 tablet by mouth Every Morning. 8/9/22 10/16/22  Edin Dean MD        Social History:   Social History     Tobacco Use   • Smoking status: Every Day     Packs/day: 1.00     Years: 20.00     Pack years: 20.00     Types: Cigarettes   • Smokeless tobacco: Never   Vaping Use   • Vaping Use: Some days   • Substances: THC   Substance Use Topics   • Alcohol use: Not Currently     Comment: occasionally   • Drug use: Yes     Types: Marijuana,  "Methamphetamines     Recent travel: not applicable    Review of Systems:  Review of Systems   Constitutional: Negative for chills and fever.   HENT: Negative for sore throat.    Eyes: Negative for photophobia.   Respiratory: Negative for shortness of breath.    Cardiovascular: Negative for chest pain.   Gastrointestinal: Negative for abdominal pain, diarrhea, nausea and vomiting.   Genitourinary: Negative for dysuria.   Musculoskeletal: Negative for neck pain.   Skin: Negative for wound.   Neurological: Negative for headaches.   Psychiatric/Behavioral: Positive for hallucinations (auditory). Negative for self-injury and suicidal ideas.        Negative homicidal ideation   All other systems reviewed and are negative.       Physical Exam:  /89   Pulse 86   Temp 98 °F (36.7 °C) (Oral)   Resp 20   Ht 188 cm (74\")   Wt 65.4 kg (144 lb 2.9 oz)   SpO2 97%   BMI 18.51 kg/m²     Physical Exam  Vitals and nursing note reviewed.   Constitutional:       General: He is not in acute distress.  HENT:      Head: Normocephalic and atraumatic.   Eyes:      Extraocular Movements: Extraocular movements intact.   Cardiovascular:      Rate and Rhythm: Normal rate and regular rhythm.   Pulmonary:      Effort: Pulmonary effort is normal. No respiratory distress.      Breath sounds: Normal breath sounds.   Abdominal:      General: Abdomen is flat.      Palpations: Abdomen is soft.      Tenderness: There is no abdominal tenderness.   Musculoskeletal:         General: Normal range of motion.      Cervical back: Normal range of motion and neck supple.   Skin:     General: Skin is warm and dry.      Capillary Refill: Capillary refill takes less than 2 seconds.   Neurological:      Mental Status: He is alert and oriented to person, place, and time. Mental status is at baseline.   Psychiatric:         Thought Content: Thought content does not include homicidal or suicidal ideation. Thought content does not include homicidal or " suicidal plan.                Medications in the Emergency Department:  Medications - No data to display     Labs  Lab Results (last 24 hours)     ** No results found for the last 24 hours. **           Imaging:  No Radiology Exams Resulted Within Past 24 Hours    Procedures:  Procedures    Progress                            Medical Decision Making:  MDM   Patient was offered to go to a drug detox/rehab program but he is declining.  Patient is stable for discharge.  He was given outpatient resources to follow-up with.        Final diagnoses:   Methamphetamine abuse (HCC)        Disposition:  ED Disposition     ED Disposition   Discharge    Condition   Stable    Comment   --             This medical record created using voice recognition software and a virtual scribe.    Documentation assistance provided by Magdalena Mcdonald acting as scribe for Dr. Aston Quinonez DO. Information recorded by the scribe was done at my direction and has been verified and validated by me.       Magdalena Mcdonald  10/16/22 1216       Aston Knight DO  10/18/22 0315

## 2022-10-16 NOTE — ED NOTES
"Pt reports auditory hallucinations when he's \"doing dope\". He reports he doesn't feel safe at home.  Pt reports he feels like \"everyone wants to kill him when he's on meth\", \"his mom wants to kill him but wanted him to go to rehab\". He doesn't want to lose his babies or mess up his head permanently. Wants to get sober. Reports smoking meth on Friday.   "

## 2022-11-07 ENCOUNTER — HOSPITAL ENCOUNTER (EMERGENCY)
Facility: HOSPITAL | Age: 37
Discharge: HOME | End: 2022-11-08
Attending: EMERGENCY MEDICINE | Admitting: EMERGENCY MEDICINE

## 2022-11-07 VITALS
BODY MASS INDEX: 19.15 KG/M2 | DIASTOLIC BLOOD PRESSURE: 87 MMHG | TEMPERATURE: 98.2 F | RESPIRATION RATE: 18 BRPM | OXYGEN SATURATION: 97 % | HEIGHT: 74 IN | SYSTOLIC BLOOD PRESSURE: 133 MMHG | HEART RATE: 66 BPM | WEIGHT: 149.25 LBS

## 2022-11-07 DIAGNOSIS — H33.21 RIGHT RETINAL DETACHMENT: Primary | ICD-10-CM

## 2022-11-07 PROCEDURE — 99283 EMERGENCY DEPT VISIT LOW MDM: CPT

## 2022-11-08 NOTE — DISCHARGE INSTRUCTIONS
You must call the [facility], in [facility], this morning between 830 and 9 AM per the instructions of Dr. Naylor, ophthalmology    The phone number for the [facility] in [facility] is [phone]
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
## 2023-01-09 ENCOUNTER — HOSPITAL ENCOUNTER (EMERGENCY)
Facility: HOSPITAL | Age: 38
Discharge: HOME | End: 2023-01-09
Attending: EMERGENCY MEDICINE | Admitting: EMERGENCY MEDICINE
Payer: COMMERCIAL

## 2023-01-09 VITALS
HEIGHT: 74 IN | TEMPERATURE: 98.2 F | HEART RATE: 102 BPM | SYSTOLIC BLOOD PRESSURE: 118 MMHG | BODY MASS INDEX: 19.27 KG/M2 | WEIGHT: 150.13 LBS | OXYGEN SATURATION: 100 % | DIASTOLIC BLOOD PRESSURE: 80 MMHG | RESPIRATION RATE: 18 BRPM

## 2023-01-09 DIAGNOSIS — B34.9 VIRAL ILLNESS: Primary | ICD-10-CM

## 2023-01-09 LAB
FLUAV AG NPH QL: NEGATIVE
FLUBV AG NPH QL IA: NEGATIVE
S PYO AG THROAT QL: NEGATIVE
SARS-COV-2 RNA PNL SPEC NAA+PROBE: NOT DETECTED

## 2023-01-09 PROCEDURE — 87804 INFLUENZA ASSAY W/OPTIC: CPT

## 2023-01-09 PROCEDURE — C9803 HOPD COVID-19 SPEC COLLECT: HCPCS | Performed by: EMERGENCY MEDICINE

## 2023-01-09 PROCEDURE — 87880 STREP A ASSAY W/OPTIC: CPT | Performed by: NURSE PRACTITIONER

## 2023-01-09 PROCEDURE — 87081 CULTURE SCREEN ONLY: CPT | Performed by: NURSE PRACTITIONER

## 2023-01-09 PROCEDURE — U0004 COV-19 TEST NON-CDC HGH THRU: HCPCS

## 2023-01-09 PROCEDURE — 99283 EMERGENCY DEPT VISIT LOW MDM: CPT

## 2023-01-09 NOTE — Clinical Note
[facility] EMERGENCY ROOM  [address]  [place] [address]  Phone: [phone]    Luis Fang was seen and treated in our emergency department on 1/9/2023.  He may return to work on 01/12/2023.         Thank you for choosing [facility].    Jamal Nino, CECIL

## 2023-01-09 NOTE — DISCHARGE INSTRUCTIONS
Return to ER if you worsen.  Your flu and strep were negative today.  COVID results will be called to you tomorrow if they are positive.  Remember to check MyChart for those results as well.  Rest, hydrate, take it easy.

## 2023-01-09 NOTE — ED PROVIDER NOTES
Time: 12:00 PM EST  Date of encounter:  1/9/2023  Independent Historian/Clinical History and Information was obtained by:   Patient  Chief Complaint: fever    History is limited by: N/A    History of Present Illness:  Patient is a 37 y.o. year old male who presents to the emergency department for evaluation of URI sxs since last night. Pt reports myalgias, fever, chills, lung pain, mild sore throat, sinus pain, n/v/d. Denies any sick exposures.      History provided by:  Patient   used: No        Patient Care Team  Primary Care Provider: Francisca Su APRN    Past Medical History:     Allergies   Allergen Reactions   • Hornet Venom Anaphylaxis     Past Medical History:   Diagnosis Date   • ADHD (attention deficit hyperactivity disorder)    • Alcoholism (HCC)    • Anxiety    • Asthma    • Autism spectrum disorder    • Bipolar disorder ([facility])    • Broken bones    • Chemical dependency ([facility])    • Depression    • Head injury    • History of migraine headaches    • History of substance abuse ([facility])    • Obsessive-compulsive disorder    • Panic disorder    • Shortness of breath    • Sinus congestion    • Substance abuse (HCC)    • Substance abuse in family    • Withdrawal symptoms, alcohol ([facility])    • Withdrawal symptoms, drug or narcotic ([facility])      Past Surgical History:   Procedure Laterality Date   • SHOULDER SURGERY Left      Family History   Problem Relation Age of Onset   • Depression Mother    • Anxiety disorder Mother    • ADD / ADHD Mother    • Diabetes Mother    • Drug abuse Father    • Self-Injurious Behavior  Sister    • Seizures Sister    • ADD / ADHD Sister    • Hypertension Maternal Grandmother    • Diabetes Maternal Grandmother    • Dementia Paternal Grandmother    • ADD / ADHD Other        Home Medications:  Prior to Admission medications    Medication Sig Start Date End Date Taking? Authorizing Provider   cetirizine (zyrTEC) 10 MG tablet Take 1 tablet by mouth Daily. 6/17/22    "Francisca Su APRN   fluticasone (Flonase) 50 MCG/ACT nasal spray 2 sprays into the nostril(s) as directed by provider Daily. 6/17/22   Francisca Su APRN   multivitamin with minerals tablet tablet Take 1 tablet by mouth Daily.    Provider, Alirio, MD        Social History:   Social History     Tobacco Use   • Smoking status: Every Day     Packs/day: 1.00     Years: 20.00     Pack years: 20.00     Types: Cigarettes   • Smokeless tobacco: Never   Vaping Use   • Vaping Use: Some days   • Substances: THC   Substance Use Topics   • Alcohol use: Not Currently     Comment: occasionally   • Drug use: Not Currently     Types: Marijuana, Methamphetamines         Review of Systems:  Review of Systems   Constitutional: Positive for chills and fever.   HENT: Positive for sinus pain and sore throat. Negative for congestion and ear pain.    Eyes: Negative for pain.   Respiratory: Negative for cough, chest tightness and shortness of breath.         Lung pain   Cardiovascular: Negative for chest pain.   Gastrointestinal: Positive for diarrhea, nausea and vomiting. Negative for abdominal pain.   Genitourinary: Negative for flank pain and hematuria.   Musculoskeletal: Positive for back pain. Negative for joint swelling.   Skin: Negative for pallor.   Neurological: Negative for seizures and headaches.   All other systems reviewed and are negative.       Physical Exam:  /80   Pulse 102   Temp 98.2 °F (36.8 °C) (Oral)   Resp 18   Ht 188 cm (74\")   Wt 68.1 kg (150 lb 2.1 oz)   SpO2 100%   BMI 19.28 kg/m²     Physical Exam  Vitals and nursing note reviewed.   Constitutional:       General: He is not in acute distress.     Appearance: Normal appearance. He is not ill-appearing, toxic-appearing or diaphoretic.   HENT:      Head: Normocephalic and atraumatic.      Nose: Nose normal.      Mouth/Throat:      Mouth: Mucous membranes are moist.      Pharynx: Posterior oropharyngeal erythema present.   Eyes:      " Conjunctiva/sclera: Conjunctivae normal.   Cardiovascular:      Rate and Rhythm: Normal rate and regular rhythm.      Pulses: Normal pulses.      Heart sounds: Normal heart sounds.   Pulmonary:      Effort: Pulmonary effort is normal.      Breath sounds: Normal breath sounds.   Abdominal:      General: Abdomen is flat. Bowel sounds are normal.      Palpations: Abdomen is soft.   Musculoskeletal:         General: Normal range of motion.      Cervical back: Normal range of motion and neck supple.   Skin:     General: Skin is warm and dry.      Capillary Refill: Capillary refill takes less than 2 seconds.   Neurological:      Mental Status: He is alert and oriented to person, place, and time. Mental status is at baseline.   Psychiatric:         Mood and Affect: Mood normal.         Behavior: Behavior normal.         Thought Content: Thought content normal.         Judgment: Judgment normal.                  Procedures:  Procedures      Medical Decision Making:      Comorbidities that affect care:    None    External Notes reviewed:    None      The following orders were placed and all results were independently analyzed by me:  Orders Placed This Encounter   Procedures   • Influenza Antigen, Rapid - Swab, Nasopharynx   • COVID-19,APTIMA PANTHER(TARIK),[facility], NP/OP SWAB IN UTM/VTM/SALINE TRANSPORT MEDIA,24 HR TAT - Swab, Nasal Cavity   • Rapid Strep A Screen - Swab, Throat   • Beta Strep Culture, Throat - Swab, Throat       Medications Given in the Emergency Department:  Medications - No data to display     ED Course:         Labs:    Lab Results (last 24 hours)     Procedure Component Value Units Date/Time    Influenza Antigen, Rapid - Swab, Nasopharynx [[id number]]  (Normal) Collected: 01/09/23 1150    Specimen: Swab from Nasopharynx Updated: 01/09/23 1226     Influenza A Ag, EIA Negative     Influenza B Ag, EIA Negative    COVID-19,APTIMA PANTHER(TARIK),[facility], NP/OP SWAB IN UTM/VTM/SALINE TRANSPORT  MEDIA,24 HR TAT - Swab, Nasal Cavity [[id number]] Collected: 01/09/23 1150    Specimen: Swab from Nasal Cavity Updated: 01/09/23 1151    Rapid Strep A Screen - Swab, Throat [[id number]]  (Normal) Collected: 01/09/23 1207    Specimen: Swab from Throat Updated: 01/09/23 1252     Strep A Ag Negative    Beta Strep Culture, Throat - Swab, Throat [[id number]] Collected: 01/09/23 1207    Specimen: Swab from Throat Updated: 01/09/23 1251           Imaging:    No Radiology Exams Resulted Within Past 24 Hours      Differential Diagnosis and Discussion:    viral etiology, strep, covid, flu, etc.    All labs were reviewed and analyzed by me.    MDM  Number of Diagnoses or Management Options  Diagnosis management comments: Seen and assessed patient as noted.  Vitals stable, no acute distress, afebrile.       Flu and strep are negative, COVID pending.  I feel the patient has a viral syndrome of some sort.  Educated him on worrisome symptoms to return for and he verbalized understanding.             Amount and/or Complexity of Data Reviewed  Clinical lab tests: reviewed and ordered    Risk of Complications, Morbidity, and/or Mortality  Presenting problems: low  Diagnostic procedures: low  Management options: low    Patient Progress  Patient progress: stable           Patient Care Considerations:          Consultants/Shared Management Plan:    None    Social Determinants of Health:    Patient is independent, reliable, and has access to care.       Disposition and Care Coordination:    Discharged: The patient is suitable and stable for discharge with no need for consideration of observation or admission.        Final diagnoses:   Viral illness        ED Disposition     ED Disposition   Discharge    Condition   Stable    Comment   --             This medical record created using voice recognition software.    Documentation assistance provided by Leesa Cabrera acting as scribe for ASHU Perales. Information recorded by the scribe was  done at my direction and has been verified and validated by me.          Leesa Cabrera  01/09/23 1219       Xuan Hickman APRN  01/09/23 1251

## 2023-01-11 LAB — BACTERIA SPEC AEROBE CULT: NORMAL

## 2023-02-20 ENCOUNTER — TELEPHONE (OUTPATIENT)
Dept: FAMILY MEDICINE CLINIC | Facility: CLINIC | Age: 38
End: 2023-02-20
Payer: COMMERCIAL

## 2023-02-20 NOTE — TELEPHONE ENCOUNTER
I do not see where he went to the emergency room.  Please call him and check on him.  Advised him to go to the emergency room if he is having chest pain.

## 2023-02-20 NOTE — TELEPHONE ENCOUNTER
Caller: Luis Fang    Relationship to patient: Self    Best call back number: [phone]    Chief complaint: CHEST PAIN    Patient directed to call 911 or go to their nearest emergency room.     Patient verbalized understanding: [x] Yes  [] No  If no, why?    Additional notes:THE PATIENT STATED HE IS HAVING CHEST PAIN. HUB RECOMMENDED PATIENT GO TO EMERGENCY ROOM. PATIENT AGREED

## 2023-05-04 ENCOUNTER — OFFICE VISIT (OUTPATIENT)
Dept: FAMILY MEDICINE CLINIC | Facility: CLINIC | Age: 38
End: 2023-05-04
Payer: COMMERCIAL

## 2023-05-04 VITALS
WEIGHT: 143 LBS | DIASTOLIC BLOOD PRESSURE: 66 MMHG | BODY MASS INDEX: 18.35 KG/M2 | TEMPERATURE: 98.1 F | OXYGEN SATURATION: 97 % | SYSTOLIC BLOOD PRESSURE: 110 MMHG | HEART RATE: 73 BPM | HEIGHT: 74 IN

## 2023-05-04 DIAGNOSIS — J30.2 SEASONAL ALLERGIC RHINITIS, UNSPECIFIED TRIGGER: ICD-10-CM

## 2023-05-04 DIAGNOSIS — J33.9 NASAL POLYPS: Primary | ICD-10-CM

## 2023-05-04 PROBLEM — IMO0002: Status: ACTIVE | Noted: 2022-11-09

## 2023-05-04 PROBLEM — H33.311 RETINAL TEAR OF RIGHT EYE: Status: ACTIVE | Noted: 2022-11-09

## 2023-05-04 PROCEDURE — 1159F MED LIST DOCD IN RCRD: CPT | Performed by: NURSE PRACTITIONER

## 2023-05-04 PROCEDURE — 99213 OFFICE O/P EST LOW 20 MIN: CPT | Performed by: NURSE PRACTITIONER

## 2023-05-04 PROCEDURE — 1160F RVW MEDS BY RX/DR IN RCRD: CPT | Performed by: NURSE PRACTITIONER

## 2023-05-04 RX ORDER — CEPHALEXIN 500 MG/1
500 CAPSULE ORAL 2 TIMES DAILY
COMMUNITY
End: 2023-05-04

## 2023-05-04 RX ORDER — LEVOCETIRIZINE DIHYDROCHLORIDE 5 MG/1
5 TABLET, FILM COATED ORAL EVERY EVENING
Qty: 30 TABLET | Refills: 2 | Status: SHIPPED | OUTPATIENT
Start: 2023-05-04

## 2023-05-04 RX ORDER — SULFAMETHOXAZOLE AND TRIMETHOPRIM 800; 160 MG/1; MG/1
1 TABLET ORAL 2 TIMES DAILY
COMMUNITY
End: 2023-05-04

## 2023-05-04 NOTE — PROGRESS NOTES
"Chief Complaint  Nasal Polyps    Subjective          Luis Fang, 37 y.o. male presents to [facility]  History of Present Illness   Patient presents today with complaints of sores/polyp in his nose.  He was seen at [facility] urgent care on 4/19/2023 with complaints of nasal congestion.  He has been on 2 different antibiotics, both Keflex and Bactrim.  He states he did not feel like anything helped.  He is wanting referral to ENT.    He states that he has tried Zyrtec and Claritin in the past without any good relief of his allergies.    PHQ-2 Depression Screening  Little interest or pleasure in doing things? 0-->not at all   Feeling down, depressed, or hopeless? 0-->not at all   PHQ-2 Total Score 0       Tobacco Use: High Risk   • Smoking Tobacco Use: Every Day   • Smokeless Tobacco Use: Never   • Passive Exposure: Not on file      Objective   Vital Signs:   /66 (BP Location: Left arm, Patient Position: Sitting, Cuff Size: Adult)   Pulse 73   Temp 98.1 °F (36.7 °C)   Ht 188 cm (74\")   Wt 64.9 kg (143 lb)   SpO2 97%   BMI 18.36 kg/m²       Current Outpatient Medications:   •  levocetirizine (XYZAL) 5 MG tablet, Take 1 tablet by mouth Every Evening., Disp: 30 tablet, Rfl: 2   Past Medical History:   Diagnosis Date   • ADHD (attention deficit hyperactivity disorder)    • Alcoholism    • Anxiety    • Asthma    • Autism spectrum disorder    • Bipolar disorder    • Broken bones    • Chemical dependency    • Depression    • Head injury    • History of migraine headaches    • History of substance abuse    • Obsessive-compulsive disorder    • Panic disorder    • Shortness of breath    • Sinus congestion    • Substance abuse    • Substance abuse in family    • Withdrawal symptoms, alcohol    • Withdrawal symptoms, drug or narcotic       Physical Exam  Vitals reviewed.   Constitutional:       Appearance: Normal appearance. He is well-developed.   HENT:      Right " "Ear: Tympanic membrane, ear canal and external ear normal.      Left Ear: Tympanic membrane, ear canal and external ear normal.      Nose:      Right Turbinates: Enlarged and swollen.      Left Turbinates: Enlarged and swollen.      Comments: Unable to visualize polyps.      Mouth/Throat:      Mouth: Mucous membranes are moist.      Pharynx: No pharyngeal swelling, oropharyngeal exudate or posterior oropharyngeal erythema.      Comments: Postnasal drainage noted.    Neck:      Thyroid: No thyroid mass, thyromegaly or thyroid tenderness.   Cardiovascular:      Rate and Rhythm: Normal rate and regular rhythm.      Heart sounds: No murmur heard.    No friction rub. No gallop.   Pulmonary:      Effort: Pulmonary effort is normal.      Breath sounds: Normal breath sounds. No wheezing or rhonchi.   Lymphadenopathy:      Cervical: No cervical adenopathy.   Skin:     General: Skin is warm and dry.   Neurological:      Mental Status: He is alert and oriented to person, place, and time.      Cranial Nerves: No cranial nerve deficit.   Psychiatric:         Mood and Affect: Mood and affect normal.         Behavior: Behavior normal.         Thought Content: Thought content normal. Thought content does not include homicidal or suicidal ideation.         Judgment: Judgment normal.        Result Review :   {The following data was reviewed by ASHU Nash    No Images in the past 120 days found..      Chief Complaint   Patient presents with   • Nasal Congestion       Has had constant runny nose for the past several months.  Pt states he has open sores in nasal cavities, states he can smell \"rotten flesh\" sometimes. States it is extremely painful when touched      HPI   Luis Fang is a 37 y.o. male  presents with complaint of sinus congestion, runny nose, occasional foul smell in sinuses.  Has been ongoing for several months.  He has tried antibiotics in the past, and is currently trying nasal sprays and allergy " medicines.  These are not successful.  He is not having any fever, chills, headache, sore throat, neck stiffness, or other associated complaints.  Physical Exam  HENT:      Nose: Congestion present.      Comments: Currently, no concerning visible nasal lesion.,  Possible left nares polyp.    Medical Decision Making  MEDICAL DECISION  Comorbidities: History of methamphetamine and alcohol abuse.  Differentials: Sinusitis, MRSA risk; this list is not all inclusive and does not constitute the entirety of considered causes  Keep your follow-up appointment with your primary care provider at the end of this week.  If you are not improving, you may need referral to ear, nose and throat specialty.  Sinusitis, unspecified chronicity, unspecified location: complicated acute illness or injury  New Medications Ordered This Visit   Medications   • neomycin-bacitracin-polymyxin (NEOSPORIN) 400-5-5000 ointment       Sig: Apply 1 application topically to the appropriate area as directed 2 (Two) Times a Day for 7 days.       Dispense:  14 g       Refill:  0   • sulfamethoxazole-trimethoprim (BACTRIM DS,SEPTRA DS) 800-160 MG per tablet       Sig: Take 1 tablet by mouth 2 (Two) Times a Day for 10 days.       Dispense:  20 tablet       Refill:  0   • cephalexin (KEFLEX) 500 MG capsule       Sig: Take 1 capsule by mouth 3 (Three) Times a Day for 10 days.       Dispense:  30 capsule       Refill:  0      Diagnoses and all orders for this visit:   Sinusitis, unspecified chronicity, unspecified location (Primary)  Mitch Elena PA-C  04/19/2023  12:05 EDT         Assessment and Plan    Diagnoses and all orders for this visit:    1. Nasal polyps (Primary)  Comments:  I will get him referred to ENT for further evaluation of his chronic nasal issues.  Orders:  -     Ambulatory Referral to ENT (Otolaryngology)    2. Seasonal allergic rhinitis, unspecified trigger  Assessment & Plan:  I will start him on Xyzal and advised him to take it every  day for his allergies.    Orders:  -     levocetirizine (XYZAL) 5 MG tablet; Take 1 tablet by mouth Every Evening.  Dispense: 30 tablet; Refill: 2      Follow Up   Return if symptoms worsen or fail to improve.  Patient was given instructions and counseling regarding his condition or for health maintenance advice. Please see specific information pulled into the AVS if appropriate.     Parts of this note are electronic transcriptions/translations of spoken language to printed text using the Dragon Dictation system.      Francisca Su, ASHU  04/21/2023

## 2023-05-04 NOTE — LETTER
May 4, 2023     Patient: Luis Fang   YOB: 1985   Date of Visit: 5/4/2023       To Whom It May Concern:    It is my medical opinion that Luis Fang may return to work 5/5/2023.         Sincerely,        ASHU Nash    CC: No Recipients

## 2023-09-10 ENCOUNTER — HOSPITAL ENCOUNTER (EMERGENCY)
Facility: HOSPITAL | Age: 38
Discharge: HOME | End: 2023-09-10
Attending: EMERGENCY MEDICINE | Admitting: EMERGENCY MEDICINE
Payer: COMMERCIAL

## 2023-09-10 VITALS
RESPIRATION RATE: 18 BRPM | SYSTOLIC BLOOD PRESSURE: 146 MMHG | HEART RATE: 88 BPM | TEMPERATURE: 98.6 F | DIASTOLIC BLOOD PRESSURE: 78 MMHG | OXYGEN SATURATION: 96 %

## 2023-09-10 DIAGNOSIS — H72.91 PERFORATION OF RIGHT TYMPANIC MEMBRANE: Primary | ICD-10-CM

## 2023-09-10 DIAGNOSIS — H66.90 ACUTE OTITIS MEDIA, UNSPECIFIED OTITIS MEDIA TYPE: ICD-10-CM

## 2023-09-10 PROCEDURE — 99283 EMERGENCY DEPT VISIT LOW MDM: CPT

## 2023-09-10 PROCEDURE — 69209 REMOVE IMPACTED EAR WAX UNI: CPT

## 2023-09-10 RX ORDER — AMOXICILLIN 500 MG/1
500 CAPSULE ORAL 2 TIMES DAILY
Qty: 20 CAPSULE | Refills: 0 | Status: SHIPPED | OUTPATIENT
Start: 2023-09-10 | End: 2023-09-15

## 2023-09-10 RX ORDER — IBUPROFEN 400 MG/1
800 TABLET ORAL ONCE
Status: COMPLETED | OUTPATIENT
Start: 2023-09-10 | End: 2023-09-10

## 2023-09-10 RX ADMIN — IBUPROFEN 800 MG: 400 TABLET, FILM COATED ORAL at 19:31

## 2023-09-10 NOTE — ED PROVIDER NOTES
Time: 6:41 PM EDT  Date of encounter:  9/10/2023  Independent Historian/Clinical History and Information was obtained by:   Patient    History is limited by: N/A    Chief Complaint: Ear pain      History of Present Illness:  Patient is a 38 y.o. year old male who presents to the emergency department for evaluation of ear pain.  Patient states that he started having ear pain 2 days ago.  Patient states that the pain is bilateral but seems to bother him more on the left.  Patient states that he feels that his hearing is muffled on the left side as well.  Patient states he does have a history of sinus issues where he has a lot of drainage.  Patient states that he is also having some tinnitus which she has had in the past.  Patient denies any sore throat or fever.  Patient denies drainage from the ear.  Patient denies any other associated symptoms.    HPI    Patient Care Team  Primary Care Provider: Francisca Su APRN    Past Medical History:     Allergies   Allergen Reactions    Hornet Venom Anaphylaxis     Past Medical History:   Diagnosis Date    ADHD (attention deficit hyperactivity disorder)     Alcoholism     Anxiety     Asthma     Autism spectrum disorder     Bipolar disorder     Broken bones     Chemical dependency     Depression     Head injury     History of migraine headaches     History of substance abuse     Obsessive-compulsive disorder     Panic disorder     Shortness of breath     Sinus congestion     Substance abuse     Substance abuse in family     Withdrawal symptoms, alcohol     Withdrawal symptoms, drug or narcotic      Past Surgical History:   Procedure Laterality Date    SHOULDER SURGERY Left      Family History   Problem Relation Age of Onset    Depression Mother     Anxiety disorder Mother     ADD / ADHD Mother     Diabetes Mother     Drug abuse Father     Self-Injurious Behavior  Sister     Seizures Sister     ADD / ADHD Sister     Hypertension Maternal Grandmother     Diabetes Maternal  Grandmother     Dementia Paternal Grandmother     ADD / ADHD Other        Home Medications:  Prior to Admission medications    Medication Sig Start Date End Date Taking? Authorizing Provider   albuterol sulfate  (90 Base) MCG/ACT inhaler Inhale 2 puffs Every 4 (Four) Hours As Needed for Wheezing. 5/14/23   Keila Page APRN   fluticasone (FLONASE) 50 MCG/ACT nasal spray 2 sprays into the nostril(s) as directed by provider Daily. 5/14/23   Keila Page APRN   levocetirizine (XYZAL) 5 MG tablet Take 1 tablet by mouth Every Evening. 5/4/23   Francisca Su APRN        Social History:   Social History     Tobacco Use    Smoking status: Every Day     Packs/day: 1.00     Years: 20.00     Pack years: 20.00     Types: Cigarettes    Smokeless tobacco: Never   Vaping Use    Vaping Use: Former    Substances: THC   Substance Use Topics    Alcohol use: Yes     Comment: occasionally    Drug use: Yes     Types: Marijuana, Methamphetamines     Comment: 1 day ago         Review of Systems:  Review of Systems   Constitutional:  Negative for fever.   HENT:  Positive for ear pain. Negative for congestion and sore throat.    Eyes:  Negative for pain.   Respiratory:  Negative for shortness of breath.    Cardiovascular:  Negative for chest pain.   Gastrointestinal:  Negative for nausea.   Musculoskeletal:  Negative for myalgias.   Neurological:  Positive for headaches.      Physical Exam:  /78 (BP Location: Left arm, Patient Position: Sitting)   Pulse 88   Temp 98.6 °F (37 °C) (Oral)   Resp 18   SpO2 96%     Physical Exam  Vitals and nursing note reviewed.   Constitutional:       General: He is not in acute distress.     Appearance: Normal appearance. He is normal weight. He is not ill-appearing, toxic-appearing or diaphoretic.   HENT:      Head: Normocephalic and atraumatic.      Right Ear: No laceration, drainage, swelling or tenderness. No middle ear effusion. There is impacted cerumen. No foreign  body. No mastoid tenderness.      Left Ear: No laceration, drainage, swelling or tenderness.  No middle ear effusion. There is no impacted cerumen. No foreign body. No mastoid tenderness. Tympanic membrane is bulging. Tympanic membrane is not injected, scarred, perforated, erythematous or retracted.      Nose: Nose normal.   Eyes:      Conjunctiva/sclera: Conjunctivae normal.      Pupils: Pupils are equal, round, and reactive to light.   Pulmonary:      Effort: Pulmonary effort is normal.   Abdominal:      General: Abdomen is flat. There is no distension.   Musculoskeletal:         General: Normal range of motion.      Cervical back: Normal range of motion and neck supple.   Skin:     General: Skin is warm and dry.   Neurological:      General: No focal deficit present.      Mental Status: He is alert and oriented to person, place, and time.   Psychiatric:         Mood and Affect: Mood normal.         Behavior: Behavior normal.         Thought Content: Thought content normal.         Judgment: Judgment normal.              Procedures:  Procedures      Medical Decision Making:    Comorbidities that affect care:    Asthma, alcoholism, autism, substance abuse    External Notes reviewed:    Previous Clinic Note: Patient was last seen in urgent care on 5- for intractable headache      The following orders were placed and all results were independently analyzed by me:  Orders Placed This Encounter   Procedures    Ambulatory Referral to ENT (Otolaryngology)    Ear wax removal       Medications Given in the Emergency Department:  Medications   ibuprofen (ADVIL,MOTRIN) tablet 800 mg (has no administration in time range)        ED Course:    ED Course as of 09/10/23 1919   Sun Sep 10, 2023   1910 RN called me to patient room at this time to revaluate R ear. Cerumen is removed and there is obvious full TM rupture with mild bleeding [MD]   1911 Note on chart review patient has cancelled 3 ENT appointments [MD]      ED  Course User Index  [MD] Adam Tai PA-C       Labs:    Lab Results (last 24 hours)       ** No results found for the last 24 hours. **             Imaging:    No Radiology Exams Resulted Within Past 24 Hours      Differential Diagnosis and Discussion:    Ear Pain: Differential diagnosis includes but is not limited to this externa, otitis media, foreign body, bullous myringitis, furuncles, herpes zoster, mastoiditis, trauma, and tumors    MDM  Number of Diagnoses or Management Options  Diagnosis management comments: Patient presented to the emergency department today for evaluation of ear pain.  Patient's pain started 2 days ago.  On initial evaluation there was bulging TM on the left side and cerumen impaction on the right.  RN flushed patient's right ear to remove cerumen and on my reevaluation there is tympanic membrane rupture with mild bleeding.  Patient has missed 3 ENT appointments but I will refer him again to ENT and begin patient on antibiotics.    Risk of Complications, Morbidity, and/or Mortality  Presenting problems: moderate  Diagnostic procedures: low  Management options: low    Patient Progress  Patient progress: stable    Consultants/Shared Management Plan:    None    Social Determinants of Health:    Patient is independent, reliable, and has access to care.       Disposition and Care Coordination:    Discharged: The patient is suitable and stable for discharge with no need for consideration of observation or admission.    I have explained the patient´s condition, diagnoses and treatment plan based on the information available to me at this time. I have answered questions and addressed any concerns. The patient has a good  understanding of the patient´s diagnosis, condition, and treatment plan as can be expected at this point. The vital signs have been stable. The patient´s condition is stable and appropriate for discharge from the emergency department.      The patient will pursue further  outpatient evaluation with the primary care physician or other designated or consulting physician as outlined in the discharge instructions. They are agreeable to this plan of care and follow-up instructions have been explained in detail. The patient has received these instructions in written format and have expressed an understanding of the discharge instructions. The patient is aware that any significant change in condition or worsening of symptoms should prompt an immediate return to this or the closest emergency department or call to 911.  I have explained discharge medications and the need for follow up with the patient/caretakers. This was also printed in the discharge instructions. Patient was discharged with the following medications and follow up:      Medication List        New Prescriptions      amoxicillin 500 MG capsule  Commonly known as: AMOXIL  Take 1 capsule by mouth 2 (Two) Times a Day for 10 days.               Where to Get Your Medications        These medications were sent to [id number] - [place], [place] - [address] AT [facility] - [phone] [facility] [phone] [address]  [address] [place] [address]      Phone: [phone]   amoxicillin 500 MG capsule      Francisca Su, APRN  [address]  [place] [address] [phone]             Final diagnoses:   Perforation of right tympanic membrane   Acute otitis media, unspecified otitis media type        ED Disposition       ED Disposition   Discharge    Condition   Stable    Comment   --               This medical record created using voice recognition software.             Adam Tai PA-C  09/10/23 1409

## 2023-09-10 NOTE — DISCHARGE INSTRUCTIONS
Please take the full course of antibiotics as directed.  Take Tylenol and ibuprofen as needed for pain.  You may have some mild bleeding and drainage from the right ear because you are eardrum on that side was ruptured.  I have sent referral to ENT and is very important that you follow-up with them. Please protect the R ear from loud noises to prevent further damage. Do not swim/get head under stagnant water until further evaluated by ENT

## 2023-09-15 ENCOUNTER — OFFICE VISIT (OUTPATIENT)
Dept: FAMILY MEDICINE CLINIC | Facility: CLINIC | Age: 38
End: 2023-09-15
Payer: COMMERCIAL

## 2023-09-15 VITALS
TEMPERATURE: 98.4 F | DIASTOLIC BLOOD PRESSURE: 62 MMHG | HEART RATE: 73 BPM | OXYGEN SATURATION: 97 % | SYSTOLIC BLOOD PRESSURE: 118 MMHG

## 2023-09-15 DIAGNOSIS — H61.21 IMPACTED CERUMEN OF RIGHT EAR: ICD-10-CM

## 2023-09-15 DIAGNOSIS — H72.91 PERFORATED TYMPANIC MEMBRANE, RIGHT: ICD-10-CM

## 2023-09-15 DIAGNOSIS — J30.2 SEASONAL ALLERGIC RHINITIS, UNSPECIFIED TRIGGER: ICD-10-CM

## 2023-09-15 DIAGNOSIS — H66.92 LEFT ACUTE OTITIS MEDIA: Primary | ICD-10-CM

## 2023-09-15 PROCEDURE — 1159F MED LIST DOCD IN RCRD: CPT | Performed by: NURSE PRACTITIONER

## 2023-09-15 PROCEDURE — 99214 OFFICE O/P EST MOD 30 MIN: CPT | Performed by: NURSE PRACTITIONER

## 2023-09-15 PROCEDURE — 1160F RVW MEDS BY RX/DR IN RCRD: CPT | Performed by: NURSE PRACTITIONER

## 2023-09-15 RX ORDER — AMOXICILLIN AND CLAVULANATE POTASSIUM 875; 125 MG/1; MG/1
1 TABLET, FILM COATED ORAL 2 TIMES DAILY
Qty: 20 TABLET | Refills: 0 | Status: SHIPPED | OUTPATIENT
Start: 2023-09-15 | End: 2023-09-25

## 2023-09-15 RX ORDER — MONTELUKAST SODIUM 10 MG/1
10 TABLET ORAL NIGHTLY
Qty: 30 TABLET | Refills: 5 | Status: SHIPPED | OUTPATIENT
Start: 2023-09-15

## 2023-09-15 RX ORDER — LEVOCETIRIZINE DIHYDROCHLORIDE 5 MG/1
5 TABLET, FILM COATED ORAL EVERY EVENING
Qty: 30 TABLET | Refills: 5 | Status: SHIPPED | OUTPATIENT
Start: 2023-09-15

## 2023-09-15 NOTE — PROGRESS NOTES
Chief Complaint  Nasal Congestion (Ongoing issue, Started a year ago )    Subjective          Luis Fang, 38 y.o. male presents to [facility]  History of Present Illness   Patient presents today for an acute visit.  He is complaining of sneezing and sinus congestion with clear drainage for 1 year.    He went to [facility] emergency room on 9/10/2023 due to ear pain.  He states that when they were irrigating his right ear that he developed a perforated eardrum.  He was prescribed amoxicillin 500 mg twice daily for perforated eardrum and otitis media.  He is complaining of impacted earwax in his right ear.  He states he was referred to an ENT but does not have an appointment until December.  He states he has to go to the [place] office because he cannot get into the [facility] office any sooner.  He states that he has missed/no showed ENT appointments so they want to see him in [place].    He states that when he was in rehab he was getting Singulair for his allergies and it seemed to help a lot more than just the antihistamine.  He does not like using the Flonase nasal spray.  He has a history of asthma but has not had any asthma flareups.  He states he has not used albuterol inhaler in a long time.    He states he is starting a new job next week and will be working from 6 AM to 4:30 PM Monday through Friday.       Tobacco Use: High Risk    Smoking Tobacco Use: Every Day    Smokeless Tobacco Use: Never    Passive Exposure: Not on file      Objective   Vital Signs:   /62 (BP Location: Left arm, Patient Position: Sitting, Cuff Size: Adult)   Pulse 73   Temp 98.4 °F (36.9 °C)   SpO2 97%       Current Outpatient Medications:     levocetirizine (XYZAL) 5 MG tablet, Take 1 tablet by mouth Every Evening., Disp: 30 tablet, Rfl: 5    amoxicillin-clavulanate (AUGMENTIN) 875-125 MG per tablet, Take 1 tablet by mouth 2 (Two) Times a Day for 10 days., Disp: 20  tablet, Rfl: 0    montelukast (Singulair) 10 MG tablet, Take 1 tablet by mouth Every Night., Disp: 30 tablet, Rfl: 5   Past Medical History:   Diagnosis Date    ADHD (attention deficit hyperactivity disorder)     Alcoholism     Anxiety     Asthma     Autism spectrum disorder     Bipolar disorder     Broken bones     Chemical dependency     Depression     Head injury     History of migraine headaches     History of substance abuse     Obsessive-compulsive disorder     Panic disorder     Shortness of breath     Sinus congestion     Substance abuse     Substance abuse in family     Withdrawal symptoms, alcohol     Withdrawal symptoms, drug or narcotic       Physical Exam  Vitals reviewed.   Constitutional:       Appearance: Normal appearance. He is well-developed.   HENT:      Right Ear: Tympanic membrane, ear canal and external ear normal. There is impacted cerumen.      Left Ear: Ear canal and external ear normal. Tympanic membrane is erythematous.      Mouth/Throat:      Mouth: Mucous membranes are moist.      Pharynx: No pharyngeal swelling, oropharyngeal exudate or posterior oropharyngeal erythema.      Comments: Postnasal drainage noted.    Neck:      Thyroid: No thyroid mass, thyromegaly or thyroid tenderness.   Cardiovascular:      Rate and Rhythm: Normal rate and regular rhythm.      Heart sounds: No murmur heard.    No friction rub. No gallop.   Pulmonary:      Effort: Pulmonary effort is normal.      Breath sounds: Normal breath sounds. No wheezing or rhonchi.   Lymphadenopathy:      Cervical: No cervical adenopathy.   Skin:     General: Skin is warm and dry.   Neurological:      Mental Status: He is alert and oriented to person, place, and time.      Cranial Nerves: No cranial nerve deficit.   Psychiatric:         Mood and Affect: Mood and affect normal.         Behavior: Behavior normal.         Thought Content: Thought content normal. Thought content does not include homicidal or suicidal ideation.          Judgment: Judgment normal.      Result Review :   {The following data was reviewed by ASHU Nash  [facility] emergency room note reviewed:  Time: 6:41 PM EDT  Date of encounter:  9/10/2023  Independent Historian/Clinical History and Information was obtained by:   Patient     Chief Complaint: Ear pain     History of Present Illness:  Patient is a 38 y.o. year old male who presents to the emergency department for evaluation of ear pain.  Patient states that he started having ear pain 2 days ago.  Patient states that the pain is bilateral but seems to bother him more on the left.  Patient states that he feels that his hearing is muffled on the left side as well.  Patient states he does have a history of sinus issues where he has a lot of drainage.  Patient states that he is also having some tinnitus which she has had in the past.  Patient denies any sore throat or fever.  Patient denies drainage from the ear.  Patient denies any other associated symptoms.  The following orders were placed and all results were independently analyzed by me:      Orders Placed This Encounter   Procedures    Ambulatory Referral to ENT (Otolaryngology)    Ear wax removal         Medications Given in the Emergency Department:  Medications   ibuprofen (ADVIL,MOTRIN) tablet 800 mg (has no administration in time range)         ED Course:         ED Course as of 09/10/23 1919   Sun Sep 10, 2023   1910 RN called me to patient room at this time to revaluate R ear. Cerumen is removed and there is obvious full TM rupture with mild bleeding [MD]   1911 Note on chart review patient has cancelled 3 ENT appointments [MD]       ED Course User Index  [MD] Adam Tia PA-C       New Prescriptions       amoxicillin 500 MG capsule  Commonly known as: AMOXIL  Take 1 capsule by mouth 2 (Two) Times a Day for 10 days.     Final diagnoses:   Perforation of right tympanic membrane   Acute otitis media, unspecified otitis media  type         ED Disposition         ED Disposition   Discharge    Condition   Stable    Comment   --               Assessment and Plan    Diagnoses and all orders for this visit:    1. Left acute otitis media (Primary)  -     amoxicillin-clavulanate (AUGMENTIN) 875-125 MG per tablet; Take 1 tablet by mouth 2 (Two) Times a Day for 10 days.  Dispense: 20 tablet; Refill: 0    2. Perforated tympanic membrane, right    3. Impacted cerumen of right ear    4. Seasonal allergic rhinitis, unspecified trigger  -     Ambulatory Referral to Allergy  -     levocetirizine (XYZAL) 5 MG tablet; Take 1 tablet by mouth Every Evening.  Dispense: 30 tablet; Refill: 5  -     montelukast (Singulair) 10 MG tablet; Take 1 tablet by mouth Every Night.  Dispense: 30 tablet; Refill: 5    I could not see the right tympanic membrane to see if it is still perforated due to the excessive earwax in his right eardrum.  I did not feel comfortable irrigating his right eardrum due to the history of having a perforated tympanic membrane just 5 days ago.  Advised him that I am going to change his antibiotic to Augmentin since he has left acute otitis media.  I advised him he needs to let the antibiotics help heal before he has his eardrum irrigated.  Since he is going to be starting a job and cannot return here for follow-up for a while, I recommend that he see an urgent care in a few weeks to have his right ear irrigated.    Due to his worsening of allergies, I will add on Singulair and he is to continue Xyzal daily.  I will also refer him to an allergist for allergy testing.      Follow Up   Return if symptoms worsen or fail to improve.  Patient was given instructions and counseling regarding his condition or for health maintenance advice. Please see specific information pulled into the AVS if appropriate.     Parts of this note are electronic transcriptions/translations of spoken language to printed text using the Dragon Dictation  system.      Francisca Su, APRN  09/15/2023

## 2023-09-21 ENCOUNTER — TELEPHONE (OUTPATIENT)
Dept: FAMILY MEDICINE CLINIC | Facility: CLINIC | Age: 38
End: 2023-09-21

## 2023-09-21 NOTE — TELEPHONE ENCOUNTER
Caller: Luis Fang    Relationship: Self    Best call back number: [phone]     What form or medical record are you requesting: PATIENTS INSURANCE INFORMATION INCLUDING COMPANY NAME AND MEMBER ID    Who is requesting this form or medical record from you: [facility]     How would you like to receive the form or medical records (pick-up, mail, fax): FAX (PATIENT DIDN'T HAVE THEIR NUMBER)    Timeframe paperwork needed: ASAP

## 2023-10-24 ENCOUNTER — OFFICE VISIT (OUTPATIENT)
Dept: FAMILY MEDICINE CLINIC | Facility: CLINIC | Age: 38
End: 2023-10-24
Payer: COMMERCIAL

## 2023-10-24 VITALS
BODY MASS INDEX: 17.97 KG/M2 | HEIGHT: 74 IN | HEART RATE: 85 BPM | SYSTOLIC BLOOD PRESSURE: 122 MMHG | DIASTOLIC BLOOD PRESSURE: 58 MMHG | WEIGHT: 140 LBS | TEMPERATURE: 98.8 F | OXYGEN SATURATION: 98 %

## 2023-10-24 DIAGNOSIS — L70.0 ACNE VULGARIS: ICD-10-CM

## 2023-10-24 DIAGNOSIS — Z00.00 ANNUAL PHYSICAL EXAM: Primary | ICD-10-CM

## 2023-10-24 RX ORDER — FLUTICASONE PROPIONATE 50 MCG
2 SPRAY, SUSPENSION (ML) NASAL DAILY
COMMUNITY
Start: 2023-09-28

## 2023-10-24 RX ORDER — NYSTATIN 100000 U/G
CREAM TOPICAL
COMMUNITY
Start: 2023-10-24 | End: 2023-11-03

## 2023-10-24 RX ORDER — AZELASTINE 1 MG/ML
2 SPRAY, METERED NASAL 2 TIMES DAILY
COMMUNITY
Start: 2023-09-28

## 2023-10-24 RX ORDER — BROMPHENIRAMINE MALEATE, PSEUDOEPHEDRINE HYDROCHLORIDE, AND DEXTROMETHORPHAN HYDROBROMIDE 2; 30; 10 MG/5ML; MG/5ML; MG/5ML
5 SYRUP ORAL
COMMUNITY
Start: 2023-10-24

## 2023-10-24 NOTE — PROGRESS NOTES
Chief Complaint  Acne and Annual Exam    Subjective          Luis Fang, 38 y.o. male presents to [facility]  History of Present Illness   Patient presents today for an acute visit.  He is complaining of chronic acne.  He is wanting a referral to dermatology.  He has acne on his face.  He is wanting a referral to dermatology.  He has acne on his face, chest, arms and back.  He states he has always had acne since he was a teenager but he has never had it treated.  He states he has tried proactive in the past but states it did not help.    He went to [facility] this morning due to a skin rash and a cough.  He was tested for strep, flu and COVID and all were negative.  He was given a cough medicine.  He was told the rash was most likely due to sweating/working.      He is also due for an annual physical.  Discussed and reviewed:   Alcohol Misuse Screening and Counseling, discussed recommendations no more than 2 drinks per day.    He does have a history of drug use.  He states that he is no longer using any methamphetamines.  He states he does smoke marijuana occasionally.  He is working on quitting smoking.  He currently has a nicotine patch.  He states he has ran out of cigarettes so he does not plan to buy anymore.  He states he only drinks alcohol occasionally and usually has no more than 2 drinks per occasion.  Cardiovascular Disease Screening Tests: Recommended fasting lipid panel and other labs.  He is not fasting today.    Counseling to Prevent Tobacco Use:   Luis Fang  reports that he has been smoking cigarettes. He has a 5.00 pack-year smoking history. He has never used smokeless tobacco.. I have educated him on the risk of diseases from using tobacco products such as cancer, COPD, and heart disease.     I advised him to quit and he is willing to quit. We have discussed the following method/s for tobacco cessation:  Education Material  "Counseling.  He is currently using nicotine patches.  He does not plan to buy anymore cigarettes.  He will follow-up with me as needed.  I spent 5 minutes counseling the patient.    [facility] IMMUNIZATIONS: Influenza and COVID19 discussed but due to him being sick today we will not give him his flu shot.  I advised him he can check with his pharmacies about getting these vaccinations after he is feeling better.       Tobacco Use: High Risk (10/24/2023)    Patient History     Smoking Tobacco Use: Every Day     Smokeless Tobacco Use: Never     Passive Exposure: Not on file      Objective   Vital Signs:   /58 (BP Location: Right arm, Patient Position: Sitting, Cuff Size: Adult)   Pulse 85   Temp 98.8 °F (37.1 °C)   Ht 188 cm (74\")   Wt 63.5 kg (140 lb)   SpO2 98%   BMI 17.97 kg/m²       Current Outpatient Medications:     azelastine (ASTELIN) 0.1 % nasal spray, 2 sprays into the nostril(s) as directed by provider 2 (Two) Times a Day., Disp: , Rfl:     brompheniramine-pseudoephedrine-DM 30-2-10 MG/5ML syrup, Take 5 mL by mouth., Disp: , Rfl:     fluticasone (FLONASE) 50 MCG/ACT nasal spray, 2 sprays by Each Nare route Daily. Shake well before using., Disp: , Rfl:     levocetirizine (XYZAL) 5 MG tablet, Take 1 tablet by mouth Every Evening., Disp: 30 tablet, Rfl: 5    montelukast (Singulair) 10 MG tablet, Take 1 tablet by mouth Every Night., Disp: 30 tablet, Rfl: 5    nystatin (MYCOSTATIN) 244272 UNIT/GM cream, Apply  topically to the appropriate area as directed., Disp: , Rfl:    Past Medical History:   Diagnosis Date    ADHD (attention deficit hyperactivity disorder)     Alcoholism     Anxiety     Asthma     Autism spectrum disorder     Bipolar disorder     Broken bones     Chemical dependency     Depression     Head injury     History of migraine headaches     History of substance abuse     Obsessive-compulsive disorder     Panic disorder     Shortness of breath     Sinus congestion     Substance abuse     " Substance abuse in family     Withdrawal symptoms, alcohol     Withdrawal symptoms, drug or narcotic       Physical Exam  Vitals reviewed.   Constitutional:       Appearance: Normal appearance. He is well-developed.   Neck:      Thyroid: No thyroid mass, thyromegaly or thyroid tenderness.   Cardiovascular:      Rate and Rhythm: Normal rate and regular rhythm.      Heart sounds: No murmur heard.     No friction rub. No gallop.   Pulmonary:      Effort: Pulmonary effort is normal.      Breath sounds: Normal breath sounds. No wheezing or rhonchi.   Lymphadenopathy:      Cervical: No cervical adenopathy.   Skin:     General: Skin is warm and dry.      Findings: Acne present.      Comments: His face, chest and back is covered with acne.  He also has some acne on his upper arms bilaterally.   Neurological:      Mental Status: He is alert and oriented to person, place, and time.      Cranial Nerves: No cranial nerve deficit.   Psychiatric:         Mood and Affect: Mood and affect normal.         Behavior: Behavior normal.         Thought Content: Thought content normal. Thought content does not include homicidal or suicidal ideation.         Judgment: Judgment normal.                 Assessment and Plan    Diagnoses and all orders for this visit:    1. Annual physical exam (Primary)  Comments:  Health maintenance information provided, see AVS.  Orders:  -     CBC Auto Differential; Future  -     Comprehensive Metabolic Panel; Future  -     Lipid Panel; Future  -     TSH Rfx On Abnormal To Free T4; Future  -     Urinalysis With Microscopic - Urine, Clean Catch; Future    2. Acne vulgaris  Assessment & Plan:  I will refer him to the [facility] dermatology.    Orders:  -     Ambulatory Referral to Dermatology        Follow Up   Return for will determine follow up after reviewing lab results.  Patient was given instructions and counseling regarding his condition or for health maintenance advice. Please see specific  information pulled into the AVS if appropriate.     Parts of this note are electronic transcriptions/translations of spoken language to printed text using the Dragon Dictation system.      Francisca Su, APRN  10/24/2023

## 2023-12-07 ENCOUNTER — TELEPHONE (OUTPATIENT)
Dept: FAMILY MEDICINE CLINIC | Facility: CLINIC | Age: 38
End: 2023-12-07

## 2023-12-07 ENCOUNTER — OFFICE VISIT (OUTPATIENT)
Dept: OTOLARYNGOLOGY | Facility: CLINIC | Age: 38
End: 2023-12-07
Payer: COMMERCIAL

## 2023-12-07 VITALS — TEMPERATURE: 97.6 F

## 2023-12-07 DIAGNOSIS — J34.89 NASAL VESTIBULITIS: Primary | ICD-10-CM

## 2023-12-07 DIAGNOSIS — H61.21 IMPACTED CERUMEN OF RIGHT EAR: ICD-10-CM

## 2023-12-07 DIAGNOSIS — J32.9 CHRONIC RHINOSINUSITIS: ICD-10-CM

## 2023-12-07 NOTE — TELEPHONE ENCOUNTER
Caller: Luis Fang    Relationship: Self    Best call back number: [phone]    What is the best time to reach you: ANYTIME, CAN LEAVE A MESSAGE IF YOU DON'T REACH HIM     Who are you requesting to speak with (clinical staff, provider,  specific staff member): NURSE       What was the call regarding: HE HAS NEVER HEARD FROM THE REFERRALS THAT [facility] WAS GOING TO SET UP FOR HIM.  WANTED TO CHECK ON THESE

## 2023-12-07 NOTE — PROGRESS NOTES
"Patient Name: Luis Fang   Visit Date: 12/07/2023   Patient ID: [id number]  Provider: Brandyn Fonseca MD    Sex: male  Location: [facility]   YOB: 1985  Location Address: [address] [place] [address], [address] [place] [address],?[place]?[id number]    Primary Care Provider Francisca Su APRN  Location Phone: [phone]    Referring Provider: Adam Tai PA-C        Chief Complaint  Nasal polyp/inner sores    History of Present Illness  Luis Fang is a 38 y.o. male who presents to [facility] today as a consult from Adam Tai PA-C for evaluation of his ears and nose.  He tells me that when the referral was originally made he had experienced some bleeding and pain in his right ear after undergoing cerumen irrigation in the emergency department.  He feels as though he cannot hear as well out of the right ear as he can out of the left.  He denies any further otalgia and has not experienced any otorrhea.  He denies any prior otologic trauma or surgery.  He also mentions frequent rhinorrhea and sneezing for a number of years.  His drainage is often clear but can be white to green at times.  The sneezing and rhinorrhea occur throughout the day and may be worse in the winter and spring.  He has tried a [place] pot in the past.  He is currently using fluticasone, azelastine, levocetirizine, and montelukast intermittently.  He has undergone allergy testing which revealed strong reaction to \"dust mites and cockroaches\".  He is currently set up to begin immunotherapy.  He denies any prior nasal surgery but does report a previous history of nasal trauma.  He is not experiencing any hyposmia but does occasionally notices a smell of \"dirty feet\" or \"rotten meat\" in his nose.  He reports anterior crusting and pain.  He has experienced occasional epistaxis.  He often experiences mid facial pressure and occasional dental pain.  He does report a " previous history of intranasal drug use.      Past Medical History:   Diagnosis Date    ADHD (attention deficit hyperactivity disorder)     Alcoholism     Anxiety     Asthma     Autism spectrum disorder     Bipolar disorder     Broken bones     Chemical dependency     Depression     Head injury     History of migraine headaches     History of substance abuse     Obsessive-compulsive disorder     Panic disorder     Shortness of breath     Sinus congestion     Substance abuse     Substance abuse in family     Withdrawal symptoms, alcohol     Withdrawal symptoms, drug or narcotic        Past Surgical History:   Procedure Laterality Date    SHOULDER SURGERY Left          Current Outpatient Medications:     azelastine (ASTELIN) 0.1 % nasal spray, 2 sprays into the nostril(s) as directed by provider 2 (Two) Times a Day., Disp: , Rfl:     fluticasone (FLONASE) 50 MCG/ACT nasal spray, 2 sprays by Each Nare route Daily. Shake well before using., Disp: , Rfl:     levocetirizine (XYZAL) 5 MG tablet, Take 1 tablet by mouth Every Evening., Disp: 30 tablet, Rfl: 5    montelukast (Singulair) 10 MG tablet, Take 1 tablet by mouth Every Night., Disp: 30 tablet, Rfl: 5    brompheniramine-pseudoephedrine-DM 30-2-10 MG/5ML syrup, Take 5 mL by mouth. (Patient not taking: Reported on 12/7/2023), Disp: , Rfl:     mupirocin (BACTROBAN) 2 % nasal ointment, 1 application  into the nostril(s) as directed by provider 2 (Two) Times a Day for 21 days. Apply to the nose twice daily, Disp: 42 g, Rfl: 0     Allergies   Allergen Reactions    Hornet Venom Anaphylaxis       Social History     Tobacco Use    Smoking status: Every Day     Packs/day: 0.50     Years: 20.00     Additional pack years: 0.00     Total pack years: 10.00     Types: Cigarettes    Smokeless tobacco: Never   Vaping Use    Vaping Use: Former    Substances: THC   Substance Use Topics    Alcohol use: Yes     Comment: occasionally    Drug use: Yes     Types: Marijuana, Methamphetamines      Comment: 1 day ago        Objective     Vital Signs:   Temp 97.6 °F (36.4 °C)       Physical Exam    General: Well developed, well nourished patient of stated age in no acute distress. Voice is strong and clear.   Head: Normocephalic and atraumatic.  Face: No lesions.  Bilateral parotid and submandibular glands are unremarkable.  Stensen's and Warthin's ducts are productive of clear saliva bilaterally.  House-Brackmann I/VI     bilaterally.   muscles and temporomandibular joint nontender to palpation.  No TMJ crepitus.  Eyes: PERRLA, sclerae anicteric, no conjunctival injection. Extraocular movements are intact and full. No nystagmus.   Ears: Auricles are normal in appearance.  Left external auditory canal and tympanic membrane are unremarkable.  Right external auditory canal is completely impacted with cerumen.  This was removed using the working otoscope and alligator forceps revealing a normal-appearing canal and tympanic membrane.  Hearing normal to conversational voice.   Nose: External nose is normal in appearance. Bilateral nares are patent with normal appearing mucosa. Septum deviated to the right.  Right greater than left inferior turbinate hypertrophy.  No lesions.   Oral Cavity: Lips are normal in appearance. Oral mucosa is unremarkable. Gingiva is unremarkable.  Partial dentition for age. Tongue is unremarkable with good movement. Hard palate is unremarkable.   Oropharynx: Soft palate is unremarkable with full movement. Uvula is unremarkable. Bilateral tonsils are unremarkable. Posterior oropharynx is unremarkable.    Larynx and hypopharynx: Deferred secondary to gag reflex.  Neck: Supple.  No mass.  Nontender to palpation.  Trachea midline. Thyroid normal size and without nodules to palpation.   Lymphatic: No lymphadenopathy upon palpation.  Respiratory: Clear to auscultation bilaterally, nonlabored respirations    Cardiovascular: RRR, no murmurs, rubs, or gallops,   Psychiatric:  Appropriate affect, cooperative   Neurologic: Oriented x 3, strength symmetric in all extremities, Cranial Nerves II-XII are grossly intact to confrontation   Skin: Warm and dry. No rashes.    Procedures     Diagnostic nasal endoscopy:    Indications: Bilateral nasal congestion in a patient with inability to visualize the middle meatus on anterior rhinoscopy.    Summary: Patient's bilateral nares were decongested and anesthetized with Afrin and lidocaine sprays respectively. After giving the medications ample time to take effect a 0° rigid endoscope was inserted in the bilateral nares revealing a right deviated nasal septum. The bilateral inferior turbinates are hypertrophied more on the right than the left.  The middle turbinates were normal in appearance. The middle meati, olfactory clefts, and sphenoethmoidal recesses were without pus, polyps, or lesion bilaterally. The nasopharynx and bilateral eustachian tube orifices were without mass or lesion. The nasal mucosa was normal in appearance. The patient tolerated the procedure well.      Result Review :               Assessment and Plan    Diagnoses and all orders for this visit:    1. Nasal vestibulitis (Primary)  -     mupirocin (BACTROBAN) 2 % nasal ointment; 1 application  into the nostril(s) as directed by provider 2 (Two) Times a Day for 21 days. Apply to the nose twice daily  Dispense: 42 g; Refill: 0    2. Impacted cerumen of right ear    3. Chronic rhinosinusitis  -     CT Sinus Without Contrast; Future      Impressions and findings were discussed at great length.  Currently, he is seen for evaluation of a right-sided cerumen impaction along with frequent nasal congestion, rhinorrhea, facial pressure, and a foul odor in his nose.  Examination today reveals evidence consistent with nasal vestibulitis as well as a right nasal septal deviation and right greater than left inferior turbinate hypertrophy.  There is also a right-sided cerumen impaction which was  successfully removed today in clinic.  We discussed the potential for chronic rhinosinusitis.  Options for further evaluation and management were discussed and he elected to proceed with CT scan of the sinuses with contrast.  He will be started on mupirocin and follow-up after his imaging.      Follow Up   No follow-ups on file.  Patient was given instructions and counseling regarding his condition or for health maintenance advice. Please see specific information pulled into the AVS if appropriate.

## 2023-12-20 ENCOUNTER — HOSPITAL ENCOUNTER (OUTPATIENT)
Dept: CT IMAGING | Facility: HOSPITAL | Age: 38
Discharge: HOME | End: 2023-12-20
Admitting: OTOLARYNGOLOGY
Payer: COMMERCIAL

## 2023-12-20 DIAGNOSIS — J32.9 CHRONIC RHINOSINUSITIS: ICD-10-CM

## 2023-12-20 PROCEDURE — 70486 CT MAXILLOFACIAL W/O DYE: CPT

## 2024-01-11 ENCOUNTER — OFFICE VISIT (OUTPATIENT)
Dept: OTOLARYNGOLOGY | Facility: CLINIC | Age: 39
End: 2024-01-11
Payer: COMMERCIAL

## 2024-01-11 VITALS — HEIGHT: 74 IN | WEIGHT: 154.6 LBS | BODY MASS INDEX: 19.84 KG/M2 | TEMPERATURE: 97.7 F

## 2024-01-11 DIAGNOSIS — J30.9 ALLERGIC RHINITIS, UNSPECIFIED SEASONALITY, UNSPECIFIED TRIGGER: ICD-10-CM

## 2024-01-11 DIAGNOSIS — J34.2 NASAL SEPTAL DEVIATION: ICD-10-CM

## 2024-01-11 DIAGNOSIS — J32.9 CHRONIC RHINOSINUSITIS: Primary | ICD-10-CM

## 2024-01-11 DIAGNOSIS — J34.3 HYPERTROPHY OF BOTH INFERIOR NASAL TURBINATES: ICD-10-CM

## 2024-01-11 PROCEDURE — 99213 OFFICE O/P EST LOW 20 MIN: CPT | Performed by: OTOLARYNGOLOGY

## 2024-01-11 RX ORDER — AMOXICILLIN AND CLAVULANATE POTASSIUM 875; 125 MG/1; MG/1
1 TABLET, FILM COATED ORAL EVERY 12 HOURS
Qty: 56 TABLET | Refills: 0 | Status: SHIPPED | OUTPATIENT
Start: 2024-01-11 | End: 2024-02-08

## 2024-01-11 RX ORDER — PREDNISONE 20 MG/1
40 TABLET ORAL DAILY
Qty: 10 TABLET | Refills: 0 | Status: SHIPPED | OUTPATIENT
Start: 2024-01-11 | End: 2024-01-16

## 2024-01-11 NOTE — PROGRESS NOTES
"Patient Name: Luis Fang   Visit Date: 01/11/2024   Patient ID: [id number]  Provider: Brandyn Fonseca MD    Sex: male  Location: [facility]   YOB: 1985  Location Address: [address] [place] [address], [address] [place] [address],?[place]?[id number]    Primary Care Provider Francisca Su APRN  Location Phone: [phone]    Referring Provider: No ref. provider found        Chief Complaint  CT results    History of Present Illness  12/7/2023:  Luis Fang is a 38 y.o. male who presents to [facility] today as a consult from No ref. provider found for evaluation of his ears and nose.  He tells me that when the referral was originally made he had experienced some bleeding and pain in his right ear after undergoing cerumen irrigation in the emergency department.  He feels as though he cannot hear as well out of the right ear as he can out of the left.  He denies any further otalgia and has not experienced any otorrhea.  He denies any prior otologic trauma or surgery.  He also mentions frequent rhinorrhea and sneezing for a number of years.  His drainage is often clear but can be white to green at times.  The sneezing and rhinorrhea occur throughout the day and may be worse in the winter and spring.  He has tried a [place] pot in the past.  He is currently using fluticasone, azelastine, levocetirizine, and montelukast intermittently.  He has undergone allergy testing which revealed strong reaction to \"dust mites and cockroaches\".  He is currently set up to begin immunotherapy.  He denies any prior nasal surgery but does report a previous history of nasal trauma.  He is not experiencing any hyposmia but does occasionally notices a smell of \"dirty feet\" or \"rotten meat\" in his nose.  He reports anterior crusting and pain.  He has experienced occasional epistaxis.  He often experiences mid facial pressure and occasional dental pain.  He does report a previous " history of intranasal drug use.  Examination revealed evidence consistent with nasal vestibulitis, right nasal septal deviation, right greater than left inferior turbinate hypertrophy, and a right sided cerumen impaction.  He was placed on mupirocin.    1/11/2024:    He returns today for follow-up.  He tells me that he continues to experience bothersome often thick rhinorrhea and sneezing.  These are his most bothersome symptoms.  He also reports intermittent nasal congestion and a burning pain in his nose.  He continues on the azelastine, fluticasone, levocetirizine, and montelukast.  CT scan of the sinuses without contrast on 12/20/2023 revealed a right-sided nasal septal deviation and spur, mild bilateral maxillary sinus mucosal thickening, and mild bilateral ethmoid sinus mucosal thickening.  His left frontal sinus is rudimentary with mild mucosal thickening.  His sphenoethmoidal recesses were occluded as well as the left ostiomeatal complex.  Past Medical History:   Diagnosis Date    ADHD (attention deficit hyperactivity disorder)     Alcoholism     Anxiety     Asthma     Autism spectrum disorder     Bipolar disorder     Broken bones     Chemical dependency     Depression     Head injury     History of migraine headaches     History of substance abuse     Obsessive-compulsive disorder     Panic disorder     Shortness of breath     Sinus congestion     Substance abuse     Substance abuse in family     Withdrawal symptoms, alcohol     Withdrawal symptoms, drug or narcotic        Past Surgical History:   Procedure Laterality Date    SHOULDER SURGERY Left          Current Outpatient Medications:     levocetirizine (XYZAL) 5 MG tablet, Take 1 tablet by mouth Every Evening., Disp: 30 tablet, Rfl: 5    montelukast (Singulair) 10 MG tablet, Take 1 tablet by mouth Every Night., Disp: 30 tablet, Rfl: 5    amoxicillin-clavulanate (AUGMENTIN) 875-125 MG per tablet, Take 1 tablet by mouth Every 12 (Twelve) Hours for 28  "days., Disp: 56 tablet, Rfl: 0    azelastine (ASTELIN) 0.1 % nasal spray, 2 sprays into the nostril(s) as directed by provider 2 (Two) Times a Day. (Patient not taking: Reported on 1/11/2024), Disp: , Rfl:     brompheniramine-pseudoephedrine-DM 30-2-10 MG/5ML syrup, Take 5 mL by mouth. (Patient not taking: Reported on 12/7/2023), Disp: , Rfl:     fluticasone (FLONASE) 50 MCG/ACT nasal spray, 2 sprays by Each Nare route Daily. Shake well before using. (Patient not taking: Reported on 1/11/2024), Disp: , Rfl:     predniSONE (DELTASONE) 20 MG tablet, Take 2 tablets by mouth Daily for 5 days., Disp: 10 tablet, Rfl: 0     Allergies   Allergen Reactions    Hornet Venom Anaphylaxis       Social History     Tobacco Use    Smoking status: Every Day     Packs/day: 0.50     Years: 20.00     Additional pack years: 0.00     Total pack years: 10.00     Types: Cigarettes    Smokeless tobacco: Never   Vaping Use    Vaping Use: Former    Substances: THC   Substance Use Topics    Alcohol use: Yes     Comment: occasionally    Drug use: Yes     Types: Marijuana, Methamphetamines     Comment: 1 day ago        Objective     Vital Signs:   Temp 97.7 °F (36.5 °C)   Ht 188 cm (74\")   Wt 70.1 kg (154 lb 9.6 oz)   BMI 19.85 kg/m²       Physical Exam    General: Well developed, well nourished patient of stated age in no acute distress. Voice is strong and clear.   Head: Normocephalic and atraumatic.  Face: No lesions.  Bilateral parotid and submandibular glands are unremarkable.  Stensen's and Warthin's ducts are productive of clear saliva bilaterally.  House-Brackmann I/VI     bilaterally.   muscles and temporomandibular joint nontender to palpation.  No TMJ crepitus.  Eyes: PERRLA, sclerae anicteric, no conjunctival injection. Extraocular movements are intact and full. No nystagmus.   Ears: Auricles are normal in appearance.  Left external auditory canal and tympanic membrane are unremarkable.  Right external auditory canal and " tympanic membrane are unremarkable.  Hearing normal to conversational voice.   Nose: External nose is normal in appearance. Bilateral nares are patent with normal appearing mucosa. Septum deviated to the right.  Right greater than left inferior turbinate hypertrophy.  No lesions.   Oral Cavity: Lips are normal in appearance. Oral mucosa is unremarkable. Gingiva is unremarkable.  Partial dentition for age. Tongue is unremarkable with good movement. Hard palate is unremarkable.   Oropharynx: Soft palate is unremarkable with full movement. Uvula is unremarkable. Bilateral tonsils are unremarkable. Posterior oropharynx is unremarkable.    Larynx and hypopharynx: Deferred secondary to gag reflex.  Neck: Supple.  No mass.  Nontender to palpation.  Trachea midline. Thyroid normal size and without nodules to palpation.   Lymphatic: No lymphadenopathy upon palpation.   Psychiatric: Appropriate affect, cooperative   Neurologic: Oriented x 3, strength symmetric in all extremities, Cranial Nerves II-XII are grossly intact to confrontation   Skin: Warm and dry. No rashes.    Procedures         Result Review :               Assessment and Plan    Diagnoses and all orders for this visit:    1. Chronic rhinosinusitis (Primary)  -     amoxicillin-clavulanate (AUGMENTIN) 875-125 MG per tablet; Take 1 tablet by mouth Every 12 (Twelve) Hours for 28 days.  Dispense: 56 tablet; Refill: 0  -     predniSONE (DELTASONE) 20 MG tablet; Take 2 tablets by mouth Daily for 5 days.  Dispense: 10 tablet; Refill: 0    2. Nasal septal deviation    3. Allergic rhinitis, unspecified seasonality, unspecified trigger    4. Hypertrophy of both inferior nasal turbinates        Impressions and findings were discussed at great length.  Currently, he continues to be bothered by rhinorrhea, sneezing, intermittent nasal congestion, and intermittent nasal pain.  We reviewed and discussed the images from his 12/20/2023 CT scan of the sinuses without contrast  which revealed a right-sided nasal septal deviation and spur, mild bilateral maxillary sinus mucosal thickening, and mild bilateral ethmoid sinus mucosal thickening.  His left frontal sinus is rudimentary with mild mucosal thickening.  His sphenoethmoidal recesses were occluded as well as the left ostiomeatal complex.  We discussed that this is consistent with mild chronic rhinosinusitis and allergic rhinitis.  He is not significantly bothered by his intermittent congestion but we did discuss the role that his deviated nasal septum and inferior turbinate hypertrophy plays in this process.  We discussed the pathophysiology and natural history of chronic rhinosinusitis.  Options for management were discussed and he elected to pursue a trial of medical management.  He will be placed on an extended course of Augmentin and burst of prednisone.  He will continue with his current nasal regimen.  He was given ample time to ask questions, all of which were answered to his satisfaction.    Follow Up   Return in about 3 months (around 4/11/2024).  Patient was given instructions and counseling regarding his condition or for health maintenance advice. Please see specific information pulled into the AVS if appropriate.

## 2024-01-22 PROCEDURE — 87491 CHLMYD TRACH DNA AMP PROBE: CPT | Performed by: PHYSICIAN ASSISTANT

## 2024-01-22 PROCEDURE — 87591 N.GONORRHOEAE DNA AMP PROB: CPT | Performed by: PHYSICIAN ASSISTANT

## 2024-01-22 PROCEDURE — 87661 TRICHOMONAS VAGINALIS AMPLIF: CPT | Performed by: PHYSICIAN ASSISTANT

## 2024-02-22 ENCOUNTER — TELEPHONE (OUTPATIENT)
Dept: FAMILY MEDICINE CLINIC | Facility: CLINIC | Age: 39
End: 2024-02-22
Payer: COMMERCIAL

## 2024-02-22 NOTE — TELEPHONE ENCOUNTER
Hub to relay:     Pts has overdue labs. Is he still wanting to get these collected? Please let our office know either way.     They can drawn as a walk in, in [place], [facility], or [facility]. Pt can have drawn in our office as well but must be scheduled on a Tuesday or Thursday.

## 2024-02-26 NOTE — TELEPHONE ENCOUNTER
Name: AnnalisaLuis    Relationship: Self    Best Callback Number: [phone]     HUB PROVIDED THE RELAY MESSAGE FROM THE OFFICE   PATIENT VOICED UNDERSTANDING AND HAS NO FURTHER QUESTIONS AT THIS TIME    ADDITIONAL INFORMATION: PATIENT STILL WANTS TO HAVE THE LABS DONE AND WILL GO TO [facility] TO HAVE THEM COLLECTED

## 2024-03-08 ENCOUNTER — HOSPITAL ENCOUNTER (EMERGENCY)
Facility: HOSPITAL | Age: 39
Discharge: HOME | End: 2024-03-08
Attending: EMERGENCY MEDICINE
Payer: COMMERCIAL

## 2024-03-08 ENCOUNTER — APPOINTMENT (OUTPATIENT)
Dept: GENERAL RADIOLOGY | Facility: HOSPITAL | Age: 39
End: 2024-03-08
Payer: COMMERCIAL

## 2024-03-08 VITALS
TEMPERATURE: 98.2 F | WEIGHT: 155.65 LBS | HEART RATE: 68 BPM | DIASTOLIC BLOOD PRESSURE: 73 MMHG | OXYGEN SATURATION: 96 % | RESPIRATION RATE: 20 BRPM | HEIGHT: 74 IN | SYSTOLIC BLOOD PRESSURE: 112 MMHG | BODY MASS INDEX: 19.98 KG/M2

## 2024-03-08 DIAGNOSIS — B34.9 VIRAL SYNDROME: Primary | ICD-10-CM

## 2024-03-08 LAB
BILIRUB UR QL STRIP: NEGATIVE
CLARITY UR: CLEAR
COLOR UR: YELLOW
FLUAV SUBTYP SPEC NAA+PROBE: NOT DETECTED
FLUBV RNA ISLT QL NAA+PROBE: NOT DETECTED
GLUCOSE UR STRIP-MCNC: NEGATIVE MG/DL
HGB UR QL STRIP.AUTO: NEGATIVE
KETONES UR QL STRIP: NEGATIVE
LEUKOCYTE ESTERASE UR QL STRIP.AUTO: NEGATIVE
NITRITE UR QL STRIP: NEGATIVE
PH UR STRIP.AUTO: 6 [PH] (ref 5–8)
PROT UR QL STRIP: NEGATIVE
RSV RNA NPH QL NAA+NON-PROBE: NOT DETECTED
S PYO AG THROAT QL: NEGATIVE
SARS-COV-2 RNA RESP QL NAA+PROBE: NOT DETECTED
SP GR UR STRIP: 1.01 (ref 1–1.03)
UROBILINOGEN UR QL STRIP: NORMAL

## 2024-03-08 PROCEDURE — 99283 EMERGENCY DEPT VISIT LOW MDM: CPT

## 2024-03-08 PROCEDURE — 63710000001 ONDANSETRON ODT 4 MG TABLET DISPERSIBLE: Performed by: NURSE PRACTITIONER

## 2024-03-08 PROCEDURE — 87637 SARSCOV2&INF A&B&RSV AMP PRB: CPT

## 2024-03-08 PROCEDURE — 74022 RADEX COMPL AQT ABD SERIES: CPT

## 2024-03-08 PROCEDURE — 87081 CULTURE SCREEN ONLY: CPT | Performed by: EMERGENCY MEDICINE

## 2024-03-08 PROCEDURE — 87880 STREP A ASSAY W/OPTIC: CPT

## 2024-03-08 PROCEDURE — 81003 URINALYSIS AUTO W/O SCOPE: CPT | Performed by: NURSE PRACTITIONER

## 2024-03-08 RX ORDER — ONDANSETRON 4 MG/1
4 TABLET, ORALLY DISINTEGRATING ORAL 4 TIMES DAILY PRN
Qty: 15 TABLET | Refills: 0 | Status: SHIPPED | OUTPATIENT
Start: 2024-03-08

## 2024-03-08 RX ORDER — IBUPROFEN 600 MG/1
600 TABLET ORAL EVERY 6 HOURS PRN
Qty: 30 TABLET | Refills: 0 | Status: SHIPPED | OUTPATIENT
Start: 2024-03-08

## 2024-03-08 RX ORDER — ONDANSETRON 4 MG/1
4 TABLET, ORALLY DISINTEGRATING ORAL ONCE
Status: COMPLETED | OUTPATIENT
Start: 2024-03-08 | End: 2024-03-08

## 2024-03-08 RX ORDER — DEXTROMETHORPHAN HYDROBROMIDE AND PROMETHAZINE HYDROCHLORIDE 15; 6.25 MG/5ML; MG/5ML
5 SYRUP ORAL 4 TIMES DAILY PRN
Qty: 180 ML | Refills: 0 | Status: SHIPPED | OUTPATIENT
Start: 2024-03-08

## 2024-03-08 RX ORDER — LOPERAMIDE HYDROCHLORIDE 2 MG/1
2 CAPSULE ORAL 4 TIMES DAILY PRN
Qty: 20 CAPSULE | Refills: 0 | Status: SHIPPED | OUTPATIENT
Start: 2024-03-08

## 2024-03-08 RX ADMIN — ONDANSETRON 4 MG: 4 TABLET, ORALLY DISINTEGRATING ORAL at 05:59

## 2024-03-08 NOTE — Clinical Note
[facility] EMERGENCY ROOM  [address] [place] [address]  Phone: [phone]  Fax: [phone]    Luis Fang was seen and treated in our emergency department on 3/8/2024.  He may return to work on 03/11/2024.         Thank you for choosing [facility].    Criss Gaines APRN

## 2024-03-08 NOTE — ED PROVIDER NOTES
"Time: 5:48 AM EST  Date of encounter:  3/8/2024  Independent Historian/Clinical History and Information was obtained by:   Patient    History is limited by: N/A    Chief Complaint: Whole body hurting      History of Present Illness:  Patient is a 38 y.o. year old male who presents to the emergency department for evaluation of generalized illness.  Patient states since yesterday he is having generalized bodyaches as well as some nausea with 1 or 2 episodes of vomiting up \"foam\".  Patient is also having diarrhea.  Cough, sore throat, and runny nose.  No rash.  Unknown fever.  Generalized chills.  No dysuria significant other is sick also but has not been seen or diagnosed with anything.  No abdominal pain    HPI    Patient Care Team  Primary Care Provider: Francisca Su APRN    Past Medical History:     Allergies   Allergen Reactions    Hornet Venom Anaphylaxis     Past Medical History:   Diagnosis Date    ADHD (attention deficit hyperactivity disorder)     Alcoholism     Anxiety     Asthma     Autism spectrum disorder     Bipolar disorder     Broken bones     Chemical dependency     Depression     Head injury     History of migraine headaches     History of substance abuse     Obsessive-compulsive disorder     Panic disorder     Shortness of breath     Sinus congestion     Substance abuse     Substance abuse in family     Withdrawal symptoms, alcohol     Withdrawal symptoms, drug or narcotic      Past Surgical History:   Procedure Laterality Date    SHOULDER SURGERY Left      Family History   Problem Relation Age of Onset    Depression Mother     Anxiety disorder Mother     ADD / ADHD Mother     Diabetes Mother     Drug abuse Father     Self-Injurious Behavior  Sister     Seizures Sister     ADD / ADHD Sister     Hypertension Maternal Grandmother     Diabetes Maternal Grandmother     Dementia Paternal Grandmother     ADD / ADHD Other        Home Medications:  Prior to Admission medications    Medication Sig " "Start Date End Date Taking? Authorizing Provider   levocetirizine (XYZAL) 5 MG tablet Take 1 tablet by mouth Every Evening. 9/15/23   Francisca Su APRN   montelukast (Singulair) 10 MG tablet Take 1 tablet by mouth Every Night. 9/15/23   Francisca Su APRN        Social History:   Social History     Tobacco Use    Smoking status: Every Day     Current packs/day: 0.50     Average packs/day: 0.5 packs/day for 20.0 years (10.0 ttl pk-yrs)     Types: Cigarettes     Passive exposure: Never    Smokeless tobacco: Never   Vaping Use    Vaping status: Former    Substances: THC   Substance Use Topics    Alcohol use: Yes     Comment: occasionally    Drug use: Defer     Comment: 1 day ago         Review of Systems:  Review of Systems   Constitutional:  Positive for chills. Negative for fever.   HENT:  Positive for sore throat. Negative for ear pain.    Eyes: Negative.    Respiratory:  Positive for cough. Negative for shortness of breath and wheezing.    Cardiovascular:  Negative for chest pain.   Gastrointestinal:  Positive for diarrhea, nausea and vomiting. Negative for abdominal pain.   Genitourinary:  Negative for dysuria and flank pain.   Musculoskeletal:  Positive for myalgias. Negative for back pain and neck pain.   Skin:  Negative for rash.   Neurological: Negative.    Hematological: Negative.    Psychiatric/Behavioral: Negative.          Physical Exam:  /73 (BP Location: Left arm, Patient Position: Sitting)   Pulse 80   Temp 98.2 °F (36.8 °C) (Oral)   Resp 20   Ht 188 cm (74\")   Wt 70.6 kg (155 lb 10.3 oz)   SpO2 97%   BMI 19.98 kg/m²     Physical Exam  Vitals and nursing note reviewed.   Constitutional:       General: He is not in acute distress.     Appearance: Normal appearance. He is not toxic-appearing.   HENT:      Head: Normocephalic and atraumatic.      Right Ear: Tympanic membrane and ear canal normal.      Left Ear: Tympanic membrane and ear canal normal.      Nose: Congestion " present.      Mouth/Throat:      Mouth: Mucous membranes are moist.      Pharynx: Uvula midline. Posterior oropharyngeal erythema present.   Eyes:      General: No scleral icterus.     Conjunctiva/sclera: Conjunctivae normal.   Cardiovascular:      Rate and Rhythm: Normal rate and regular rhythm.      Pulses: Normal pulses.      Heart sounds: Normal heart sounds.   Pulmonary:      Effort: Pulmonary effort is normal. No respiratory distress.      Breath sounds: Normal breath sounds.   Abdominal:      General: Abdomen is flat.      Palpations: Abdomen is soft.      Tenderness: There is no abdominal tenderness.   Musculoskeletal:         General: Normal range of motion.      Cervical back: Normal range of motion and neck supple.   Lymphadenopathy:      Cervical: No cervical adenopathy.   Skin:     General: Skin is warm and dry.   Neurological:      Mental Status: He is alert and oriented to person, place, and time. Mental status is at baseline.   Psychiatric:         Mood and Affect: Mood normal.         Behavior: Behavior normal.            Medical Decision Making:      Comorbidities that affect care:    Asthma, Smoking, Substance Abuse    External Notes reviewed:    Previous Clinic Note: Last seen in urgent care on February 24 for tachycardia      The following orders were placed and all results were independently analyzed by me:  Orders Placed This Encounter   Procedures    COVID-19, FLU A/B, RSV PCR 1 HR TAT - Swab, Nasopharynx    Rapid Strep A Screen - Swab, Throat    Beta Strep Culture, Throat - Swab, Throat    XR Abdomen 2+ VW with Chest 1 VW    Urinalysis With Microscopic If Indicated (No Culture) - Urine, Clean Catch       Medications Given in the Emergency Department:  Medications   ondansetron ODT (ZOFRAN-ODT) disintegrating tablet 4 mg (4 mg Oral Given 3/8/24 0559)        ED Course:         Labs:    Lab Results (last 24 hours)       Procedure Component Value Units Date/Time    COVID-19, FLU A/B, RSV PCR 1  HR TAT - Swab, Nasopharynx [[id number]]  (Normal) Collected: 03/08/24 0549    Specimen: Swab from Nasopharynx Updated: 03/08/24 0639     COVID19 Not Detected     Influenza A PCR Not Detected     Influenza B PCR Not Detected     RSV, PCR Not Detected    Narrative:      Fact sheet for providers: https://www.fda.gov/media/772009/download    Fact sheet for patients: https://www.fda.gov/media/122954/download    Test performed by PCR.    Rapid Strep A Screen - Swab, Throat [[id number]]  (Normal) Collected: 03/08/24 0549    Specimen: Swab from Throat Updated: 03/08/24 0627     Strep A Ag Negative    Beta Strep Culture, Throat - Swab, Throat [[id number]] Collected: 03/08/24 0549    Specimen: Swab from Throat Updated: 03/08/24 0627    Urinalysis With Microscopic If Indicated (No Culture) - Urine, Clean Catch [[id number]] Collected: 03/08/24 0650    Specimen: Urine, Clean Catch Updated: 03/08/24 0652             Imaging:    XR Abdomen 2+ VW with Chest 1 VW    Result Date: 3/8/2024  PROCEDURE: XR ABDOMEN 2+ VIEWS W CHEST 1 VW  COMPARISON: [facility] NURY Sebastian, XR CHEST 2 VW, 5/14/2023, 20:20.  INDICATIONS: cough/vomitting/diarrhea  FINDINGS:  The lungs are clear.  Cardiac and mediastinal contours are normal.  No pneumothorax is seen.  Pulmonary vascularity is normal.  No free air on the upright view.  Bowel gas pattern is normal with no obstruction identified.  No foreign body.  No evidence of renal or ureteral calculus.       Negative acute abdominal series.     DAKOTAH MARCIAL MD       Electronically Signed and Approved By: DAKOTAH MARCIAL MD on 3/08/2024 at 6:06                Differential Diagnosis and Discussion:    Cough: Differential diagnosis includes but is not limited to pneumonia, acute bronchitis, upper respiratory infection, ACE inhibitor use, allergic reaction, epiglottitis, seasonal allergies, chemical irritants, exercise-induced asthma, viral syndrome.  Vomiting: Differential diagnosis includes but  is not limited to migraine, labyrinthine disorders, psychogenic, metabolic and endocrine causes, peptic ulcer, gastric outlet obstruction, gastritis, gastroenteritis, appendicitis, intestinal obstruction, paralytic ileus, food poisoning, cholecystitis, acute hepatitis, acute pancreatitis, acute febrile illness, and myocardial infarction.    All labs were reviewed and interpreted by me.  All X-rays impressions were independently interpreted by me.    MDM  Number of Diagnoses or Management Options  Viral syndrome  Diagnosis management comments: I have explained the patient´s condition, diagnoses and treatment plan based on the information available to me at this time. I have answered questions and addressed any concerns. The patient has a good  understanding of the patient´s diagnosis, condition, and treatment plan as can be expected at this point. The vital signs have been stable. The patient´s condition is stable and appropriate for discharge from the emergency department.      The patient will pursue further outpatient evaluation with the primary care physician or other designated or consulting physician as outlined in the discharge instructions. They are agreeable to this plan of care and follow-up instructions have been explained in detail. The patient has received these instructions in written format and have expressed an understanding of the discharge instructions. The patient is aware that any significant change in condition or worsening of symptoms should prompt an immediate return to this or the closest emergency department or call to 911.       Amount and/or Complexity of Data Reviewed  Clinical lab tests: ordered and reviewed  Tests in the radiology section of CPT®: ordered and reviewed  Tests in the medicine section of CPT®: reviewed and ordered    Risk of Complications, Morbidity, and/or Mortality  Presenting problems: low  Diagnostic procedures: low  Management options: low    Patient Progress  Patient  progress: stable         Patient Care Considerations:    ANTIBIOTICS: I considered prescribing antibiotics as an outpatient however no bacterial focus of infection was found.      Consultants/Shared Management Plan:    None    Social Determinants of Health:    Patient is independent, reliable, and has access to care.       Disposition and Care Coordination:    Discharged: The patient is suitable and stable for discharge with no need for consideration of admission.    I have explained the patient´s condition, diagnoses and treatment plan based on the information available to me at this time. I have answered questions and addressed any concerns. The patient has a good  understanding of the patient´s diagnosis, condition, and treatment plan as can be expected at this point. The vital signs have been stable. The patient´s condition is stable and appropriate for discharge from the emergency department.      The patient will pursue further outpatient evaluation with the primary care physician or other designated or consulting physician as outlined in the discharge instructions. They are agreeable to this plan of care and follow-up instructions have been explained in detail. The patient has received these instructions in written format and has expressed an understanding of the discharge instructions. The patient is aware that any significant change in condition or worsening of symptoms should prompt an immediate return to this or the closest emergency department or call to 911.  I have explained discharge medications and the need for follow up with the patient/caretakers. This was also printed in the discharge instructions. Patient was discharged with the following medications and follow up:      Medication List        New Prescriptions      ibuprofen 600 MG tablet  Commonly known as: ADVIL,MOTRIN  Take 1 tablet by mouth Every 6 (Six) Hours As Needed for Moderate Pain or Mild Pain.     loperamide 2 MG capsule  Commonly  known as: IMODIUM  Take 1 capsule by mouth 4 (Four) Times a Day As Needed for Diarrhea.     ondansetron ODT 4 MG disintegrating tablet  Commonly known as: ZOFRAN-ODT  Place 1 tablet on the tongue 4 (Four) Times a Day As Needed for Nausea or Vomiting.     promethazine-dextromethorphan 6.25-15 MG/5ML syrup  Commonly known as: PROMETHAZINE-DM  Take 5 mL by mouth 4 (Four) Times a Day As Needed for Cough.               Where to Get Your Medications        These medications were sent to [id number] - [place], [place] - [address] AT [facility] - [phone]  - [phone]   [address] [place] [address] [place] [address]      Phone: [phone]   ibuprofen 600 MG tablet  loperamide 2 MG capsule  ondansetron ODT 4 MG disintegrating tablet  promethazine-dextromethorphan 6.25-15 MG/5ML syrup      Francisca Su, APRN  [address] [place] [address]  [phone]    On 3/11/2024  As needed       Final diagnoses:   Viral syndrome        ED Disposition       ED Disposition   Discharge    Condition   Stable    Comment   --               This medical record created using voice recognition software.             Criss Gaines APRN  03/08/24 0649       Criss Gaines APRN  03/08/24 0656

## 2024-03-08 NOTE — DISCHARGE INSTRUCTIONS
As we discussed all testing was negative and did not show any acute abnormality.    rest.  Drink plenty of fluids.  Start with clear liquids then advance diet as tolerated    Alternate Tylenol and Motrin for fever or pain.    Take medications as prescribed for symptomatic treatment    Follow-up with PCP as needed    Return for new or worsening symptoms

## 2024-03-10 LAB — BACTERIA SPEC AEROBE CULT: NORMAL

## 2024-03-11 ENCOUNTER — TELEPHONE (OUTPATIENT)
Dept: FAMILY MEDICINE CLINIC | Facility: CLINIC | Age: 39
End: 2024-03-11
Payer: COMMERCIAL

## 2024-03-26 ENCOUNTER — HOSPITAL ENCOUNTER (EMERGENCY)
Facility: HOSPITAL | Age: 39
Discharge: LEFT BEFORE DISCHARGE | End: 2024-03-26
Payer: COMMERCIAL

## 2024-03-26 ENCOUNTER — APPOINTMENT (OUTPATIENT)
Dept: CT IMAGING | Facility: HOSPITAL | Age: 39
End: 2024-03-26
Payer: COMMERCIAL

## 2024-03-26 VITALS
HEIGHT: 74 IN | RESPIRATION RATE: 22 BRPM | TEMPERATURE: 97.8 F | OXYGEN SATURATION: 99 % | HEART RATE: 60 BPM | BODY MASS INDEX: 20.14 KG/M2 | WEIGHT: 156.97 LBS | DIASTOLIC BLOOD PRESSURE: 75 MMHG | SYSTOLIC BLOOD PRESSURE: 138 MMHG

## 2024-03-26 PROCEDURE — 25810000003 SODIUM CHLORIDE 0.9 % SOLUTION: Performed by: NURSE PRACTITIONER

## 2024-03-26 PROCEDURE — 99211 OFF/OP EST MAY X REQ PHY/QHP: CPT

## 2024-03-26 PROCEDURE — 25010000002 KETOROLAC TROMETHAMINE PER 15 MG: Performed by: NURSE PRACTITIONER

## 2024-03-26 PROCEDURE — 25010000002 METOCLOPRAMIDE PER 10 MG: Performed by: NURSE PRACTITIONER

## 2024-03-26 PROCEDURE — 25010000002 DIPHENHYDRAMINE PER 50 MG: Performed by: NURSE PRACTITIONER

## 2024-03-26 RX ORDER — KETOROLAC TROMETHAMINE 30 MG/ML
30 INJECTION, SOLUTION INTRAMUSCULAR; INTRAVENOUS ONCE
Status: COMPLETED | OUTPATIENT
Start: 2024-03-26 | End: 2024-03-26

## 2024-03-26 RX ORDER — METOCLOPRAMIDE HYDROCHLORIDE 5 MG/ML
10 INJECTION INTRAMUSCULAR; INTRAVENOUS ONCE
Status: COMPLETED | OUTPATIENT
Start: 2024-03-26 | End: 2024-03-26

## 2024-03-26 RX ORDER — DIPHENHYDRAMINE HYDROCHLORIDE 50 MG/ML
25 INJECTION INTRAMUSCULAR; INTRAVENOUS ONCE
Status: COMPLETED | OUTPATIENT
Start: 2024-03-26 | End: 2024-03-26

## 2024-03-26 RX ADMIN — DIPHENHYDRAMINE HYDROCHLORIDE 25 MG: 50 INJECTION, SOLUTION INTRAMUSCULAR; INTRAVENOUS at 06:06

## 2024-03-26 RX ADMIN — METOCLOPRAMIDE HYDROCHLORIDE 10 MG: 5 INJECTION INTRAMUSCULAR; INTRAVENOUS at 06:06

## 2024-03-26 RX ADMIN — SODIUM CHLORIDE 500 ML: 9 INJECTION, SOLUTION INTRAVENOUS at 06:02

## 2024-03-26 RX ADMIN — KETOROLAC TROMETHAMINE 30 MG: 30 INJECTION, SOLUTION INTRAMUSCULAR; INTRAVENOUS at 06:06

## 2024-03-26 NOTE — ED TRIAGE NOTES
Pt to ed from home with c/o migraine. Worst headache he has had. Nausea. Pain on right side. Did not take any medication before coming here.

## 2024-04-03 NOTE — PROGRESS NOTES
Enter Query Response Below      Query Response: No I did not see the patient.  This was in the period when they were awaiting the 6 AM doctor, when they asked me for medications while patient was waiting to be evaluated then I was going to read round on him but he had already left             If applicable, please update the problem list.   Patient: Luis Fang        : 1985  Account: [id number]           Admit Date: 3/26/2024    Please update your ED Provider Note with the answer to the following query:        Criss Gaines, APRN      Date: 2024     Will you please addend your provider note to include any and all known final diagnosis or symptoms?    Did you see and evaluate this patient before they left the ED?  IV medications were ordered and administered in the ED.      If yes, please document an ED provider note.      If you performed a medical screening exam in triage, please document a medical screening exam note to include the reason for the visit.  If the note does not allow you to include the reason for the visit, please document the reason for the visit in either an electronically signed physician query, or a separate ED note as an addendum.     Thank you for your help.     If you have questions about this query, please contact me at [phone]    Sincerely,  [facility] Coding Department

## 2024-10-08 ENCOUNTER — HOSPITAL ENCOUNTER (EMERGENCY)
Facility: HOSPITAL | Age: 39
Discharge: HOME | End: 2024-10-08
Attending: EMERGENCY MEDICINE
Payer: MEDICAID

## 2024-10-08 VITALS
TEMPERATURE: 98.6 F | OXYGEN SATURATION: 100 % | RESPIRATION RATE: 18 BRPM | DIASTOLIC BLOOD PRESSURE: 73 MMHG | HEIGHT: 74 IN | BODY MASS INDEX: 18.73 KG/M2 | SYSTOLIC BLOOD PRESSURE: 121 MMHG | WEIGHT: 145.94 LBS | HEART RATE: 75 BPM

## 2024-10-08 DIAGNOSIS — T23.152A SUPERFICIAL BURN OF PALM OF LEFT HAND, INITIAL ENCOUNTER: Primary | ICD-10-CM

## 2024-10-08 DIAGNOSIS — T23.259A PARTIAL THICKNESS BURN OF PALM, UNSPECIFIED LATERALITY, INITIAL ENCOUNTER: ICD-10-CM

## 2024-10-08 PROCEDURE — 99283 EMERGENCY DEPT VISIT LOW MDM: CPT

## 2024-10-08 RX ORDER — OXYCODONE AND ACETAMINOPHEN 5; 325 MG/1; MG/1
1 TABLET ORAL EVERY 6 HOURS PRN
Qty: 12 TABLET | Refills: 0 | Status: SHIPPED | OUTPATIENT
Start: 2024-10-08

## 2024-10-08 RX ORDER — GINSENG 100 MG
1 CAPSULE ORAL 2 TIMES DAILY
Qty: 28 G | Refills: 0 | Status: SHIPPED | OUTPATIENT
Start: 2024-10-08

## 2024-10-08 RX ORDER — OXYCODONE AND ACETAMINOPHEN 5; 325 MG/1; MG/1
1 TABLET ORAL ONCE
Status: COMPLETED | OUTPATIENT
Start: 2024-10-08 | End: 2024-10-08

## 2024-10-08 RX ADMIN — OXYCODONE HYDROCHLORIDE AND ACETAMINOPHEN 1 TABLET: 5; 325 TABLET ORAL at 15:49

## 2024-10-08 NOTE — ED PROVIDER NOTES
Time: 4:04 PM EDT  Date of encounter:  10/8/2024  Independent Historian/Clinical History and Information was obtained by:   Patient    History is limited by: N/A    Chief Complaint: burn      History of Present Illness:  Patient is a 39 y.o. year old right-handed male who presents to the emergency department for evaluation of left hand burn.  Patient states he burned his left hand around 1:30 PM today when he grabbed part of the lawnmower.  He applied some ointment to his hand.      Patient Care Team  Primary Care Provider: Francisca Su APRN    Past Medical History:     Allergies   Allergen Reactions    Hornet Venom Anaphylaxis     Past Medical History:   Diagnosis Date    ADHD (attention deficit hyperactivity disorder)     Alcoholism     Anxiety     Asthma     Autism spectrum disorder     Bipolar disorder     Broken bones     Chemical dependency     Depression     Head injury     History of migraine headaches     History of substance abuse     Obsessive-compulsive disorder     Panic disorder     Shortness of breath     Sinus congestion     Substance abuse     Substance abuse in family     Withdrawal symptoms, alcohol     Withdrawal symptoms, drug or narcotic      Past Surgical History:   Procedure Laterality Date    SHOULDER SURGERY Left      Family History   Problem Relation Age of Onset    Depression Mother     Anxiety disorder Mother     ADD / ADHD Mother     Diabetes Mother     Drug abuse Father     Self-Injurious Behavior  Sister     Seizures Sister     ADD / ADHD Sister     Hypertension Maternal Grandmother     Diabetes Maternal Grandmother     Dementia Paternal Grandmother     ADD / ADHD Other        Home Medications:  Prior to Admission medications    Medication Sig Start Date End Date Taking? Authorizing Provider   cetirizine (zyrTEC) 10 MG tablet Take 1 tablet by mouth Daily.    Provider, MD Alirio   ibuprofen (ADVIL,MOTRIN) 600 MG tablet Take 1 tablet by mouth Every 6 (Six) Hours As Needed  "for Moderate Pain or Mild Pain. 3/8/24   Criss Gaines APRN   loperamide (IMODIUM) 2 MG capsule Take 1 capsule by mouth 4 (Four) Times a Day As Needed for Diarrhea. 3/8/24   Criss Gaines APRN   montelukast (Singulair) 10 MG tablet Take 1 tablet by mouth Every Night. 9/15/23   Francisca Su APRN   ondansetron ODT (ZOFRAN-ODT) 4 MG disintegrating tablet Place 1 tablet on the tongue 4 (Four) Times a Day As Needed for Nausea or Vomiting. 3/8/24   Criss Gaines APRN   promethazine-dextromethorphan (PROMETHAZINE-DM) 6.25-15 MG/5ML syrup Take 5 mL by mouth 4 (Four) Times a Day As Needed for Cough. 3/8/24   Criss Gaines APRN        Social History:   Social History     Tobacco Use    Smoking status: Every Day     Current packs/day: 0.50     Average packs/day: 0.5 packs/day for 20.0 years (10.0 ttl pk-yrs)     Types: Cigarettes     Passive exposure: Never    Smokeless tobacco: Never   Vaping Use    Vaping status: Former    Substances: THC   Substance Use Topics    Alcohol use: Yes     Comment: occasionally    Drug use: Defer     Comment: 1 day ago         Review of Systems:  Review of Systems   Skin:  Positive for wound.        Physical Exam:  /73 (BP Location: Right arm, Patient Position: Sitting)   Pulse 75   Temp 98.6 °F (37 °C) (Oral)   Resp 18   Ht 188 cm (74\")   Wt 66.2 kg (145 lb 15.1 oz)   SpO2 100%   BMI 18.74 kg/m²     Physical Exam  Vitals and nursing note reviewed.   Constitutional:       Appearance: Normal appearance.   HENT:      Head: Normocephalic and atraumatic.   Eyes:      Extraocular Movements: Extraocular movements intact.      Conjunctiva/sclera: Conjunctivae normal.      Pupils: Pupils are equal, round, and reactive to light.   Pulmonary:      Effort: Pulmonary effort is normal.   Musculoskeletal:        Hands:       Cervical back: Normal range of motion and neck supple.   Skin:     General: Skin is warm and dry.   Neurological:      General: No focal deficit present.      Mental " Status: He is alert and oriented to person, place, and time.                  Medical Decision Making:      Comorbidities that affect care:    None    External Notes reviewed:    None      The following orders were placed and all results were independently analyzed by me:  No orders of the defined types were placed in this encounter.      Medications Given in the Emergency Department:  Medications   oxyCODONE-acetaminophen (PERCOCET) 5-325 MG per tablet 1 tablet (1 tablet Oral Given 10/8/24 0965)        ED Course:  Bacitracin and dressing applied to hand.  Patient provided pain medication.  Discussed treatment plan and plan for discharge.  Patient verbalized understanding agrees with plan.       Differential Diagnosis and Discussion:    First, second, third degree burn        MDM       Patient Care Considerations:    ANTIBIOTICS: I considered prescribing antibiotics as an outpatient however no bacterial focus of infection was found.      Consultants/Shared Management Plan:    None    Social Determinants of Health:    Patient is independent, reliable, and has access to care.       Disposition and Care Coordination:    Discharged: The patient is suitable and stable for discharge with no need for consideration of admission.    I have explained discharge medications and the need for follow up with the patient/caretakers. This was also printed in the discharge instructions. Patient was discharged with the following medications and follow up:      Medication List        New Prescriptions      bacitracin 500 UNIT/GM ointment  Apply 1 Application topically to the appropriate area as directed 2 (Two) Times a Day.     oxyCODONE-acetaminophen 5-325 MG per tablet  Commonly known as: PERCOCET  Take 1 tablet by mouth Every 6 (Six) Hours As Needed for Severe Pain or Moderate Pain.               Where to Get Your Medications        These medications were sent to [id number] - [place], [place] - [address] AT  [address] [facility] [address] - [phone]  - [phone] FX  [address] [facility] [place] [address]      Phone: [phone]   bacitracin 500 UNIT/GM ointment  oxyCODONE-acetaminophen 5-325 MG per tablet      Francisca Su, APRN  [address] [place] [address] [phone]          [facility]  [address] [place] [address] [phone]           Final diagnoses:   Superficial burn of palm of left hand, initial encounter   Partial thickness burn of palm, unspecified laterality, initial encounter        ED Disposition       ED Disposition   Discharge    Condition   Stable    Comment   --               This medical record created using voice recognition software.             Fanny Case, ASHU  10/08/24 3081

## 2024-10-08 NOTE — DISCHARGE INSTRUCTIONS
Wash hand with baby soap twice a day and apply bacitracin ointment.  Take pain medications as directed.  Use precaution when take pain medications as narcotics can be addictive and sedating.  Follow-up with wound clinic this week and return to ED for new or worsening symptoms.

## 2025-05-08 NOTE — PROGRESS NOTES
Chief Complaint  Hemorrhoids, Allergies, sore in nose (Right nostril ), and Hernia    Patient or patient representative verbalized consent for the use of Ambient Listening during the visit with  ASHU Nash for chart documentation. 5/9/2025  10:02 EDT    Subjective            Luis Fang is a 39 y.o. male who presents to [facility]   History of Present Illness  History of Present Illness  The patient presents for evaluation of hernia, hemorrhoids, allergies, and depression.    He reports a suspected hernia in the lower left abdomen or groin region, which becomes prominent during physical activities such as sit-ups or crunches. There is no associated pain unless engaging in strenuous activities. This issue has been present since childhood but has recently begun to interfere with his workouts.    He is experiencing discomfort due to external hemorrhoids. He has tried Preparation H but has not sought other over-the-counter remedies.    Persistent allergy symptoms are reported, including daily sneezing and nasal discharge, particularly severe upon waking. Despite undergoing a scan and x-ray, no abnormalities were detected in the sinus cavity. He has previously been prescribed Singulair and Zyrtec for allergies but did not perceive any significant improvement, possibly due to irregular medication intake.    He was previously admitted to [facility] for depression and was discharged on 05/02/2025. He was prescribed Wellbutrin but has only taken it once due to apprehension about potential side effects. He reports a lack of a support system and describes his current state as miserable. He is currently on medical leave from work and has a history of methamphetamine addiction. There are no scheduled follow-up appointments with a psychiatrist. He reports no suicidal ideation or current drug use.    SOCIAL HISTORY  He reports no drug use currently.      PHQ-9 Depression  Screening  Little interest or pleasure in doing things? Several days   Feeling down, depressed, or hopeless? More than half the days   PHQ-2 Total Score 3   Trouble falling or staying asleep, or sleeping too much? Several days   Feeling tired or having little energy? Several days   Poor appetite or overeating? Not at all   Feeling bad about yourself - or that you are a failure or have let yourself or your family down? Several days   Trouble concentrating on things, such as reading the newspaper or watching television? Nearly every day   Moving or speaking so slowly that other people could have noticed? Or the opposite - being so fidgety or restless that you have been moving around a lot more than usual? Several days     Thoughts that you would be better off dead, or of hurting yourself in some way? Not at all   PHQ-9 Total Score 10   If you checked off any problems, how difficult have these problems made it for you to do your work, take care of things at home, or get along with other people? Very difficult             Tobacco Use: High Risk (5/9/2025)    Patient History     Smoking Tobacco Use: Every Day     Smokeless Tobacco Use: Never     Passive Exposure: Never      E-cigarette/Vaping    E-cigarette/Vaping Use Never User     Passive Exposure No     Counseling Given No      E-cigarette/Vaping Substances    Nicotine No     THC No     CBD No     Flavoring No      E-cigarette/Vaping Devices    Disposable No     Pre-filled or Refillable Cartridge No     Refillable Tank No     Pre-filled Pod No        Alcohol Use: Not on file         Objective   Vital Signs:   Vitals:    05/09/25 0943   BP: 117/78   Pulse: 81   Temp: 98.2 °F (36.8 °C)   SpO2: 98%   Weight: 70.6 kg (155 lb 9.6 oz)     Body mass index is 20.53 kg/m².    Wt Readings from Last 3 Encounters:   05/09/25 70.6 kg (155 lb 9.6 oz)   02/24/25 62.9 kg (138 lb 11.2 oz)   10/08/24 66.2 kg (145 lb 15.1 oz)     BP Readings from Last 3 Encounters:   05/09/25 117/78    02/24/25 156/94   01/28/25 134/90       Health Maintenance   Topic Date Due    Pneumococcal Vaccine 0-49 (1 of 2 - PCV) Never done    COVID-19 Vaccine (1 - 2024-25 season) Never done    ANNUAL PHYSICAL  10/24/2024    INFLUENZA VACCINE  07/01/2025    TDAP/TD VACCINES (4 - Tdap) 06/17/2032    HEPATITIS C SCREENING  Completed       /78   Pulse 81   Temp 98.2 °F (36.8 °C)   Wt 70.6 kg (155 lb 9.6 oz)   SpO2 98%   BMI 20.53 kg/m²       Current Outpatient Medications:     buPROPion XL (WELLBUTRIN XL) 150 MG 24 hr tablet, Take 1 tablet by mouth Daily. FOR DEPRESSION, Disp: , Rfl:     cetirizine (zyrTEC) 10 MG tablet, Take 1 tablet by mouth Daily. Indications: Perennial Allergic Rhinitis, Disp: 90 tablet, Rfl: 3    montelukast (Singulair) 10 MG tablet, Take 1 tablet by mouth Every Night. Indications: Perennial Allergic Rhinitis, Disp: 90 tablet, Rfl: 3    Hydrocortisone, Perianal, (ANUSOL-HC) 2.5 % rectal cream, Insert  into the rectum 2 (Two) Times a Day. Indications: hemorroids, Disp: 28 g, Rfl: 0   Past Medical History:   Diagnosis Date    ADHD (attention deficit hyperactivity disorder)     Alcoholism     Anxiety     Asthma     Autism spectrum disorder     Bipolar disorder     Broken bones     Chemical dependency     Depression     Head injury     History of migraine headaches     History of substance abuse     Obsessive-compulsive disorder     Panic disorder     Shortness of breath     Sinus congestion     Substance abuse     Substance abuse in family     Withdrawal symptoms, alcohol     Withdrawal symptoms, drug or narcotic         Physical Exam  Vitals reviewed.   Constitutional:       Appearance: Normal appearance. He is well-developed.   HENT:      Right Ear: Tympanic membrane, ear canal and external ear normal.      Left Ear: Tympanic membrane, ear canal and external ear normal.      Mouth/Throat:      Mouth: Mucous membranes are moist.      Pharynx: Postnasal drip present. No pharyngeal swelling,  oropharyngeal exudate or posterior oropharyngeal erythema.   Neck:      Thyroid: No thyroid mass, thyromegaly or thyroid tenderness.   Cardiovascular:      Rate and Rhythm: Normal rate and regular rhythm.      Heart sounds: No murmur heard.     No friction rub. No gallop.   Pulmonary:      Effort: Pulmonary effort is normal.      Breath sounds: Normal breath sounds. No wheezing or rhonchi.   Abdominal:      Palpations: Abdomen is soft.      Tenderness: There is no abdominal tenderness.      Hernia: A hernia is present.       Lymphadenopathy:      Cervical: No cervical adenopathy.   Skin:     General: Skin is warm and dry.   Neurological:      Mental Status: He is alert and oriented to person, place, and time.      Cranial Nerves: No cranial nerve deficit.   Psychiatric:         Mood and Affect: Mood and affect normal.         Behavior: Behavior normal.         Thought Content: Thought content normal. Thought content does not include homicidal or suicidal ideation.         Judgment: Judgment normal.       Physical Exam        Result Review :    The following data was reviewed by: ASHU Nash on 05/09/2025:           No Images in the past 120 days found..    Results         Assessment & Plan  Hemorrhoids, unspecified hemorrhoid type    Orders:    Hydrocortisone, Perianal, (ANUSOL-HC) 2.5 % rectal cream; Insert  into the rectum 2 (Two) Times a Day. Indications: hemorroids    Moderate episode of recurrent major depressive disorder    Seasonal allergic rhinitis, unspecified trigger    Orders:    cetirizine (zyrTEC) 10 MG tablet; Take 1 tablet by mouth Daily. Indications: Perennial Allergic Rhinitis    montelukast (Singulair) 10 MG tablet; Take 1 tablet by mouth Every Night. Indications: Perennial Allergic Rhinitis    Abdominal wall bulge    Orders:    US Abdomen Limited; Future      Assessment & Plan  1. Hernia.  - Reports a bulge in the lower left abdomen or groin area during physical activities like  sit-ups or crunches.  - Physical examination reveals no pain currently but discomfort during sit-ups.  - An ultrasound has been ordered to confirm the presence of a hernia.  - Advised to avoid strenuous activities and heavy lifting until further notice. If the ultrasound confirms a hernia, a referral to a surgeon will be made for potential surgical intervention.    2. Hemorrhoids.  - Reports external hemorrhoids causing discomfort.  - Prescription for a rectal cream has been provided, to be applied twice daily for a week.  - The cream should be inserted into the rectum and can also be applied externally if needed.  - If there is no improvement, a consultation with a surgeon may be necessary.    3. Allergies.  - Reports persistent allergy symptoms, including sneezing and nasal drainage.  - Physical examination reveals drainage in the back of the throat.  - Prescriptions for Zyrtec and Singulair have been renewed to manage these symptoms.  - Advised to take these medications regularly to assess their effectiveness.    4. Depression.  - Advised to start Wellbutrin 150 mg daily in the morning with food.  - Informed that Wellbutrin is not addictive and can be discontinued if side effects occur.  - Will follow up in 3 weeks to assess the effectiveness and tolerance of the medication.  - If adverse effects or lack of improvement occur, alternative treatments will be considered.         Diagnosis Plan   1. Hemorrhoids, unspecified hemorrhoid type  Hydrocortisone, Perianal, (ANUSOL-HC) 2.5 % rectal cream      2. Moderate episode of recurrent major depressive disorder        3. Seasonal allergic rhinitis, unspecified trigger  cetirizine (zyrTEC) 10 MG tablet    montelukast (Singulair) 10 MG tablet      4. Abdominal wall bulge  US Abdomen Limited            FOLLOW UP  Return in about 3 weeks (around 5/30/2025) for 30 min apt for complex pt f/u depression, hernia, hemorroids.  Patient was given instructions and counseling  regarding his condition or for health maintenance advice. Please see specific information pulled into the AVS if appropriate.       CURRENT & DISCONTINUED MEDICATIONS  Current Outpatient Medications   Medication Instructions    buPROPion XL (WELLBUTRIN XL) 150 mg, Daily    cetirizine (ZYRTEC) 10 mg, Oral, Daily    Hydrocortisone, Perianal, (ANUSOL-HC) 2.5 % rectal cream Rectal, 2 Times Daily    montelukast (SINGULAIR) 10 mg, Oral, Nightly       Medications Discontinued During This Encounter   Medication Reason    ibuprofen (ADVIL,MOTRIN) 600 MG tablet Historical Med - Therapy completed    montelukast (Singulair) 10 MG tablet Reorder    cetirizine (zyrTEC) 10 MG tablet Reorder        Parts of this note are electronic transcriptions/translations of spoken language to printed text using the Dragon Dictation system.    Francisca Su, ASHU  05/09/25  10:11 EDT

## 2025-05-09 ENCOUNTER — OFFICE VISIT (OUTPATIENT)
Dept: FAMILY MEDICINE CLINIC | Facility: CLINIC | Age: 40
End: 2025-05-09
Payer: COMMERCIAL

## 2025-05-09 VITALS
HEART RATE: 81 BPM | DIASTOLIC BLOOD PRESSURE: 78 MMHG | OXYGEN SATURATION: 98 % | TEMPERATURE: 98.2 F | BODY MASS INDEX: 20.53 KG/M2 | SYSTOLIC BLOOD PRESSURE: 117 MMHG | WEIGHT: 155.6 LBS

## 2025-05-09 DIAGNOSIS — R19.00 ABDOMINAL WALL BULGE: ICD-10-CM

## 2025-05-09 DIAGNOSIS — K64.9 HEMORRHOIDS, UNSPECIFIED HEMORRHOID TYPE: Primary | ICD-10-CM

## 2025-05-09 DIAGNOSIS — J30.2 SEASONAL ALLERGIC RHINITIS, UNSPECIFIED TRIGGER: ICD-10-CM

## 2025-05-09 DIAGNOSIS — F33.1 MODERATE EPISODE OF RECURRENT MAJOR DEPRESSIVE DISORDER: ICD-10-CM

## 2025-05-09 PROCEDURE — 99214 OFFICE O/P EST MOD 30 MIN: CPT | Performed by: NURSE PRACTITIONER

## 2025-05-09 RX ORDER — CETIRIZINE HYDROCHLORIDE 10 MG/1
10 TABLET ORAL DAILY
Qty: 90 TABLET | Refills: 3 | Status: SHIPPED | OUTPATIENT
Start: 2025-05-09

## 2025-05-09 RX ORDER — MONTELUKAST SODIUM 10 MG/1
10 TABLET ORAL NIGHTLY
Qty: 90 TABLET | Refills: 3 | Status: SHIPPED | OUTPATIENT
Start: 2025-05-09

## 2025-05-09 RX ORDER — HYDROCORTISONE 25 MG/G
CREAM TOPICAL 2 TIMES DAILY
Qty: 28 G | Refills: 0 | Status: SHIPPED | OUTPATIENT
Start: 2025-05-09

## 2025-05-09 RX ORDER — BUPROPION HYDROCHLORIDE 150 MG/1
150 TABLET ORAL DAILY
COMMUNITY
Start: 2025-05-01

## 2025-05-09 NOTE — ASSESSMENT & PLAN NOTE
Orders:    Hydrocortisone, Perianal, (ANUSOL-HC) 2.5 % rectal cream; Insert  into the rectum 2 (Two) Times a Day. Indications: hemorroids

## 2025-05-09 NOTE — ASSESSMENT & PLAN NOTE
Orders:    cetirizine (zyrTEC) 10 MG tablet; Take 1 tablet by mouth Daily. Indications: Perennial Allergic Rhinitis    montelukast (Singulair) 10 MG tablet; Take 1 tablet by mouth Every Night. Indications: Perennial Allergic Rhinitis

## 2025-07-30 ENCOUNTER — HOSPITAL ENCOUNTER (EMERGENCY)
Facility: HOSPITAL | Age: 40
Discharge: HOME | End: 2025-07-30
Attending: EMERGENCY MEDICINE | Admitting: EMERGENCY MEDICINE
Payer: MEDICAID

## 2025-07-30 VITALS
HEIGHT: 74 IN | WEIGHT: 145 LBS | RESPIRATION RATE: 18 BRPM | DIASTOLIC BLOOD PRESSURE: 69 MMHG | BODY MASS INDEX: 18.61 KG/M2 | TEMPERATURE: 98 F | SYSTOLIC BLOOD PRESSURE: 112 MMHG | HEART RATE: 94 BPM | OXYGEN SATURATION: 100 %

## 2025-07-30 DIAGNOSIS — K02.9 DENTAL CARIES: Primary | ICD-10-CM

## 2025-07-30 PROCEDURE — 99283 EMERGENCY DEPT VISIT LOW MDM: CPT

## 2025-07-30 PROCEDURE — 25010000002 KETOROLAC TROMETHAMINE PER 15 MG

## 2025-07-30 PROCEDURE — 96372 THER/PROPH/DIAG INJ SC/IM: CPT

## 2025-07-30 RX ORDER — KETOROLAC TROMETHAMINE 30 MG/ML
30 INJECTION, SOLUTION INTRAMUSCULAR; INTRAVENOUS ONCE
Status: COMPLETED | OUTPATIENT
Start: 2025-07-30 | End: 2025-07-30

## 2025-07-30 RX ORDER — KETOROLAC TROMETHAMINE 30 MG/ML
30 INJECTION, SOLUTION INTRAMUSCULAR; INTRAVENOUS ONCE
Status: DISCONTINUED | OUTPATIENT
Start: 2025-07-30 | End: 2025-07-30

## 2025-07-30 RX ORDER — KETOROLAC TROMETHAMINE 10 MG/1
10 TABLET, FILM COATED ORAL EVERY 6 HOURS PRN
Qty: 12 TABLET | Refills: 0 | Status: SHIPPED | OUTPATIENT
Start: 2025-07-30

## 2025-07-30 RX ORDER — CEPHALEXIN 500 MG/1
500 CAPSULE ORAL 3 TIMES DAILY
Qty: 21 CAPSULE | Refills: 0 | Status: SHIPPED | OUTPATIENT
Start: 2025-07-30 | End: 2025-08-06

## 2025-07-30 RX ADMIN — KETOROLAC TROMETHAMINE 30 MG: 30 INJECTION INTRAMUSCULAR; INTRAVENOUS at 13:16

## 2025-07-30 RX ADMIN — TOPICAL ANESTHETIC 1 SPRAY: 200 SPRAY DENTAL; PERIODONTAL at 13:16

## 2025-07-30 NOTE — ED PROVIDER NOTES
Time: 1:08 PM EDT  Date of encounter:  7/30/2025  Independent Historian/Clinical History and Information was obtained by:   Patient    History is limited by: N/A    Chief Complaint: Dental Pain      History of Present Illness:  Patient is a 40 y.o. year old male who presents to the emergency department for evaluation of dental pain. Right lower bottom tooth is broken and root is exposed. Patient does not have dental insurance.       Patient Care Team  Primary Care Provider: Francisca Su APRN    Past Medical History:     Allergies   Allergen Reactions    Hornet Venom Anaphylaxis     Past Medical History:   Diagnosis Date    ADHD (attention deficit hyperactivity disorder)     Alcoholism     Anxiety     Asthma     Autism spectrum disorder     Bipolar disorder     Broken bones     Chemical dependency     Depression     Head injury     History of migraine headaches     History of substance abuse     Obsessive-compulsive disorder     Panic disorder     Shortness of breath     Sinus congestion     Substance abuse     Substance abuse in family     Withdrawal symptoms, alcohol     Withdrawal symptoms, drug or narcotic      Past Surgical History:   Procedure Laterality Date    SHOULDER SURGERY Left      Family History   Problem Relation Age of Onset    Depression Mother     Anxiety disorder Mother     ADD / ADHD Mother     Diabetes Mother     Drug abuse Father     Self-Injurious Behavior  Sister     Seizures Sister     ADD / ADHD Sister     Hypertension Maternal Grandmother     Diabetes Maternal Grandmother     Dementia Paternal Grandmother     ADD / ADHD Other        Home Medications:  Prior to Admission medications    Medication Sig Start Date End Date Taking? Authorizing Provider   buPROPion XL (WELLBUTRIN XL) 150 MG 24 hr tablet Take 1 tablet by mouth Daily. FOR DEPRESSION 5/1/25 7/30/25  Provider, MD Alirio   cetirizine (zyrTEC) 10 MG tablet Take 1 tablet by mouth Daily. Indications: Perennial Allergic  "Rhinitis 5/9/25 7/30/25  Francisca Su APRN   Hydrocortisone, Perianal, (ANUSOL-HC) 2.5 % rectal cream Insert  into the rectum 2 (Two) Times a Day. Indications: hemorroids 5/9/25 7/30/25  Francisca Su APRN   montelukast (Singulair) 10 MG tablet Take 1 tablet by mouth Every Night. Indications: Perennial Allergic Rhinitis 5/9/25 7/30/25  Francisca Su APRN        Social History:   Social History     Tobacco Use    Smoking status: Every Day     Current packs/day: 0.50     Average packs/day: 0.5 packs/day for 20.0 years (10.0 ttl pk-yrs)     Types: Cigarettes     Passive exposure: Never    Smokeless tobacco: Never   Vaping Use    Vaping status: Never Used   Substance Use Topics    Alcohol use: Yes     Comment: occasionally    Drug use: Defer     Comment: 1 day ago         Review of Systems:  Review of Systems   Constitutional:  Negative for chills and fever.   HENT:  Positive for dental problem. Negative for congestion, rhinorrhea and sore throat.    Eyes:  Negative for pain and visual disturbance.   Respiratory:  Negative for apnea, cough, chest tightness and shortness of breath.    Cardiovascular:  Negative for chest pain and palpitations.   Gastrointestinal:  Negative for abdominal pain, diarrhea, nausea and vomiting.   Genitourinary:  Negative for difficulty urinating and dysuria.   Musculoskeletal:  Negative for joint swelling and myalgias.   Skin:  Negative for color change.   Neurological:  Negative for seizures and headaches.   Psychiatric/Behavioral: Negative.     All other systems reviewed and are negative.       Physical Exam:  /68   Pulse 92   Temp 98.9 °F (37.2 °C) (Oral)   Resp 18   Ht 188 cm (74\")   Wt 65.8 kg (145 lb)   SpO2 100%   BMI 18.62 kg/m²     Physical Exam  Vitals and nursing note reviewed.   Constitutional:       General: He is not in acute distress.     Appearance: Normal appearance. He is not toxic-appearing.   HENT:      Head: Normocephalic and " atraumatic.      Jaw: There is normal jaw occlusion. Tenderness present.      Right Ear: Tympanic membrane normal.      Left Ear: Tympanic membrane normal.      Nose: Nose normal.      Mouth/Throat:      Mouth: Mucous membranes are moist.      Pharynx: Oropharynx is clear.   Eyes:      General: Lids are normal.      Extraocular Movements: Extraocular movements intact.      Conjunctiva/sclera: Conjunctivae normal.      Pupils: Pupils are equal, round, and reactive to light.   Cardiovascular:      Rate and Rhythm: Normal rate and regular rhythm.      Pulses: Normal pulses.      Heart sounds: Normal heart sounds.   Pulmonary:      Effort: Pulmonary effort is normal. No respiratory distress.      Breath sounds: Normal breath sounds. No wheezing or rhonchi.   Abdominal:      General: Abdomen is flat.      Palpations: Abdomen is soft.      Tenderness: There is no abdominal tenderness. There is no guarding or rebound.   Musculoskeletal:         General: Normal range of motion.      Cervical back: Normal range of motion and neck supple.      Right lower leg: No edema.      Left lower leg: No edema.   Skin:     General: Skin is warm and dry.   Neurological:      Mental Status: He is alert and oriented to person, place, and time. Mental status is at baseline.   Psychiatric:         Mood and Affect: Mood normal.                    Medical Decision Making:      Comorbidities that affect care:    None    External Notes reviewed:    None      The following orders were placed and all results were independently analyzed by me:  No orders of the defined types were placed in this encounter.      Medications Given in the Emergency Department:  Medications   benzocaine (HURRICAINE) 20 % liquid solution 1 spray (has no administration in time range)   ketorolac (TORADOL) injection 30 mg (has no administration in time range)        ED Course:         Labs:    Lab Results (last 24 hours)       ** No results found for the last 24 hours. **              Imaging:    No Radiology Exams Resulted Within Past 24 Hours      Differential Diagnosis and Discussion:    Dental Pain: Differential diagnosis includes but is not limited to dental caries, periodontitis, pericoronitis, peridental abscess, gingival abscess, apthous stomatitis, allergic stomatitis, acute necrotizing ulcerative gingivitis, herpetic stomatitis.    PROCEDURES:        No orders to display       Procedures    MDM  Number of Diagnoses or Management Options  Dental caries  Diagnosis management comments: Patient referred to [facility] school of dentistry due to not having dental insurance.  Patient has a fractured molar with root exposure.  Discussed with patient the need for antibiotics prior to having a tooth removed with the dentist.  Airway is clear and patent.  There is no facial swelling.                       Patient Care Considerations:          Consultants/Shared Management Plan:    SHARED VISIT: I have discussed the case with my supervising physician, Dr. Medellin who statesagrees with plan of care. The substantive portion of the medical decision was made by the attesting physician who made or approve the management plan and will take responsibility for the patient.  Clinical findings were discussed and ultimate disposition was made in consult with supervising physician.    Social Determinants of Health:    Patient is independent, reliable, and has access to care.       Disposition and Care Coordination:    Discharged: The patient is suitable and stable for discharge with no need for consideration of admission.    I have explained the patient´s condition, diagnoses and treatment plan based on the information available to me at this time. I have answered questions and addressed any concerns. The patient has a good  understanding of the patient´s diagnosis, condition, and treatment plan as can be expected at this point. The vital signs have been stable. The patient´s condition is stable and  appropriate for discharge from the emergency department.      The patient will pursue further outpatient evaluation with the primary care physician or other designated or consulting physician as outlined in the discharge instructions. They are agreeable to this plan of care and follow-up instructions have been explained in detail. The patient has received these instructions in written format and has expressed an understanding of the discharge instructions. The patient is aware that any significant change in condition or worsening of symptoms should prompt an immediate return to this or the closest emergency department or call to 911.    Final diagnoses:   Dental caries        ED Disposition       ED Disposition   Discharge    Condition   Stable    Comment   --               This medical record created using voice recognition software.             Fanny Carrillo, APRN  07/30/25 [id number]

## 2025-08-06 NOTE — ED PROVIDER NOTES
"SHARED VISIT ATTESTATION:    This visit was performed by myself and an APC.  I personally approved the management plan/medical decision making and take responsibility for the patient management.      SHARED VISIT NOTE:    Patient is 40 y.o. year old male that presents to the ED for evaluation of dental pain, tooth broken with exposed root.     Physical Exam    ED Course:    /69   Pulse 94   Temp 98 °F (36.7 °C)   Resp 18   Ht 188 cm (74\")   Wt 65.8 kg (145 lb)   SpO2 100%   BMI 18.62 kg/m²       The following orders were placed and all results were independently analyzed by me:  No orders of the defined types were placed in this encounter.      Medications Given in the Emergency Department:  Medications   benzocaine (HURRICAINE) 20 % liquid solution 1 spray (1 spray Mouth/Throat Given 7/30/25 1316)   ketorolac (TORADOL) injection 30 mg (30 mg Intramuscular Given 7/30/25 1316)        ED Course:         Labs:    Lab Results (last 24 hours)       ** No results found for the last 24 hours. **             Imaging:    No Radiology Exams Resulted Within Past 24 Hours    MDM:    Procedures                         Juan F Medellin MD  16:16 EDT  08/06/25         Juan F Medellin MD  08/06/25 [id number]    "